# Patient Record
Sex: FEMALE | Race: BLACK OR AFRICAN AMERICAN | Employment: OTHER
[De-identification: names, ages, dates, MRNs, and addresses within clinical notes are randomized per-mention and may not be internally consistent; named-entity substitution may affect disease eponyms.]

---

## 2023-11-03 ENCOUNTER — APPOINTMENT (OUTPATIENT)
Facility: HOSPITAL | Age: 88
End: 2023-11-03
Payer: MEDICARE

## 2023-11-03 ENCOUNTER — HOSPITAL ENCOUNTER (INPATIENT)
Facility: HOSPITAL | Age: 88
LOS: 6 days | End: 2023-11-10
Attending: STUDENT IN AN ORGANIZED HEALTH CARE EDUCATION/TRAINING PROGRAM | Admitting: FAMILY MEDICINE
Payer: MEDICARE

## 2023-11-03 DIAGNOSIS — R41.82 ALTERED MENTAL STATUS, UNSPECIFIED ALTERED MENTAL STATUS TYPE: ICD-10-CM

## 2023-11-03 DIAGNOSIS — R00.0 TACHYCARDIA: ICD-10-CM

## 2023-11-03 DIAGNOSIS — D72.829 LEUKOCYTOSIS, UNSPECIFIED TYPE: ICD-10-CM

## 2023-11-03 DIAGNOSIS — E87.0 HYPERNATREMIA: ICD-10-CM

## 2023-11-03 DIAGNOSIS — A41.9 SEPSIS, DUE TO UNSPECIFIED ORGANISM, UNSPECIFIED WHETHER ACUTE ORGAN DYSFUNCTION PRESENT (HCC): ICD-10-CM

## 2023-11-03 DIAGNOSIS — R79.89 ELEVATED TROPONIN: ICD-10-CM

## 2023-11-03 DIAGNOSIS — Z51.5 END OF LIFE CARE: Primary | ICD-10-CM

## 2023-11-03 LAB
ALBUMIN SERPL-MCNC: 3.1 G/DL (ref 3.4–5)
ALBUMIN/GLOB SERPL: 0.7 (ref 0.8–1.7)
ALP SERPL-CCNC: 87 U/L (ref 45–117)
ALT SERPL-CCNC: 32 U/L (ref 13–56)
ANION GAP SERPL CALC-SCNC: 8 MMOL/L (ref 3–18)
APAP SERPL-MCNC: <2 UG/ML (ref 10–30)
AST SERPL-CCNC: 22 U/L (ref 10–38)
BASOPHILS # BLD: 0 K/UL (ref 0–0.1)
BASOPHILS NFR BLD: 0 % (ref 0–2)
BILIRUB SERPL-MCNC: 0.8 MG/DL (ref 0.2–1)
BUN SERPL-MCNC: 68 MG/DL (ref 7–18)
BUN/CREAT SERPL: 45 (ref 12–20)
CALCIUM SERPL-MCNC: 9.6 MG/DL (ref 8.5–10.1)
CHLORIDE SERPL-SCNC: 130 MMOL/L (ref 100–111)
CO2 SERPL-SCNC: 27 MMOL/L (ref 21–32)
CREAT SERPL-MCNC: 1.51 MG/DL (ref 0.6–1.3)
DIFFERENTIAL METHOD BLD: ABNORMAL
EOSINOPHIL # BLD: 0 K/UL (ref 0–0.4)
EOSINOPHIL NFR BLD: 0 % (ref 0–5)
ERYTHROCYTE [DISTWIDTH] IN BLOOD BY AUTOMATED COUNT: 15.5 % (ref 11.6–14.5)
ETHANOL SERPL-MCNC: <3 MG/DL (ref 0–3)
GLOBULIN SER CALC-MCNC: 4.4 G/DL (ref 2–4)
GLUCOSE BLD STRIP.AUTO-MCNC: 162 MG/DL (ref 70–110)
GLUCOSE SERPL-MCNC: 173 MG/DL (ref 74–99)
HCT VFR BLD AUTO: 59.5 % (ref 35–45)
HGB BLD-MCNC: 18.4 G/DL (ref 12–16)
IMM GRANULOCYTES # BLD AUTO: 0.2 K/UL (ref 0–0.04)
IMM GRANULOCYTES NFR BLD AUTO: 1 % (ref 0–0.5)
LACTATE BLD-SCNC: 1.52 MMOL/L (ref 0.4–2)
LYMPHOCYTES # BLD: 1.5 K/UL (ref 0.9–3.6)
LYMPHOCYTES NFR BLD: 7 % (ref 21–52)
MCH RBC QN AUTO: 28.8 PG (ref 24–34)
MCHC RBC AUTO-ENTMCNC: 30.9 G/DL (ref 31–37)
MCV RBC AUTO: 93.1 FL (ref 78–100)
MONOCYTES # BLD: 1 K/UL (ref 0.05–1.2)
MONOCYTES NFR BLD: 5 % (ref 3–10)
NEUTS SEG # BLD: 18.2 K/UL (ref 1.8–8)
NEUTS SEG NFR BLD: 87 % (ref 40–73)
NRBC # BLD: 0 K/UL (ref 0–0.01)
NRBC BLD-RTO: 0 PER 100 WBC
PLATELET # BLD AUTO: 381 K/UL (ref 135–420)
PLATELET COMMENT: ABNORMAL
PMV BLD AUTO: 11.4 FL (ref 9.2–11.8)
POTASSIUM SERPL-SCNC: 3.8 MMOL/L (ref 3.5–5.5)
PROT SERPL-MCNC: 7.5 G/DL (ref 6.4–8.2)
RBC # BLD AUTO: 6.39 M/UL (ref 4.2–5.3)
RBC MORPH BLD: ABNORMAL
SALICYLATES SERPL-MCNC: <1.7 MG/DL (ref 2.8–20)
SODIUM SERPL-SCNC: 165 MMOL/L (ref 136–145)
TROPONIN I SERPL HS-MCNC: 61 NG/L (ref 0–54)
TROPONIN I SERPL HS-MCNC: 69 NG/L (ref 0–54)
TSH SERPL DL<=0.05 MIU/L-ACNC: 0.11 UIU/ML (ref 0.36–3.74)
WBC # BLD AUTO: 20.9 K/UL (ref 4.6–13.2)

## 2023-11-03 PROCEDURE — 80053 COMPREHEN METABOLIC PANEL: CPT

## 2023-11-03 PROCEDURE — 2580000003 HC RX 258: Performed by: STUDENT IN AN ORGANIZED HEALTH CARE EDUCATION/TRAINING PROGRAM

## 2023-11-03 PROCEDURE — 96365 THER/PROPH/DIAG IV INF INIT: CPT

## 2023-11-03 PROCEDURE — 84443 ASSAY THYROID STIM HORMONE: CPT

## 2023-11-03 PROCEDURE — 70450 CT HEAD/BRAIN W/O DYE: CPT

## 2023-11-03 PROCEDURE — 71045 X-RAY EXAM CHEST 1 VIEW: CPT

## 2023-11-03 PROCEDURE — 93005 ELECTROCARDIOGRAM TRACING: CPT | Performed by: STUDENT IN AN ORGANIZED HEALTH CARE EDUCATION/TRAINING PROGRAM

## 2023-11-03 PROCEDURE — 82962 GLUCOSE BLOOD TEST: CPT

## 2023-11-03 PROCEDURE — 84484 ASSAY OF TROPONIN QUANT: CPT

## 2023-11-03 PROCEDURE — 80143 DRUG ASSAY ACETAMINOPHEN: CPT

## 2023-11-03 PROCEDURE — 80179 DRUG ASSAY SALICYLATE: CPT

## 2023-11-03 PROCEDURE — 83605 ASSAY OF LACTIC ACID: CPT

## 2023-11-03 PROCEDURE — 85025 COMPLETE CBC W/AUTO DIFF WBC: CPT

## 2023-11-03 PROCEDURE — 82077 ASSAY SPEC XCP UR&BREATH IA: CPT

## 2023-11-03 PROCEDURE — 6360000002 HC RX W HCPCS: Performed by: STUDENT IN AN ORGANIZED HEALTH CARE EDUCATION/TRAINING PROGRAM

## 2023-11-03 PROCEDURE — 99285 EMERGENCY DEPT VISIT HI MDM: CPT

## 2023-11-03 PROCEDURE — 87040 BLOOD CULTURE FOR BACTERIA: CPT

## 2023-11-03 PROCEDURE — 96366 THER/PROPH/DIAG IV INF ADDON: CPT

## 2023-11-03 RX ORDER — DEXTROSE MONOHYDRATE 50 MG/ML
INJECTION, SOLUTION INTRAVENOUS CONTINUOUS
Status: DISCONTINUED | OUTPATIENT
Start: 2023-11-03 | End: 2023-11-07

## 2023-11-03 RX ADMIN — CEFEPIME 2000 MG: 2 INJECTION, POWDER, FOR SOLUTION INTRAVENOUS at 21:45

## 2023-11-04 PROBLEM — Z86.73 RECENT CEREBROVASCULAR ACCIDENT (CVA): Status: ACTIVE | Noted: 2023-11-04

## 2023-11-04 PROBLEM — I10 HTN (HYPERTENSION): Status: ACTIVE | Noted: 2023-11-04

## 2023-11-04 PROBLEM — E11.9 DM (DIABETES MELLITUS) (HCC): Status: ACTIVE | Noted: 2023-11-04

## 2023-11-04 PROBLEM — E87.0 HYPERNATREMIA: Status: ACTIVE | Noted: 2023-11-04

## 2023-11-04 PROBLEM — D72.829 LEUKOCYTOSIS: Status: ACTIVE | Noted: 2023-11-04

## 2023-11-04 PROBLEM — Z86.39 HISTORY OF HYPOTHYROIDISM: Status: ACTIVE | Noted: 2023-11-04

## 2023-11-04 LAB
AMORPH CRY URNS QL MICRO: ABNORMAL
AMPHET UR QL SCN: NEGATIVE
ANION GAP SERPL CALC-SCNC: 11 MMOL/L (ref 3–18)
ANION GAP SERPL CALC-SCNC: 9 MMOL/L (ref 3–18)
APPEARANCE UR: ABNORMAL
BACTERIA URNS QL MICRO: ABNORMAL /HPF
BARBITURATES UR QL SCN: NEGATIVE
BENZODIAZ UR QL: NEGATIVE
BILIRUB UR QL: ABNORMAL
BUN SERPL-MCNC: 81 MG/DL (ref 7–18)
BUN SERPL-MCNC: 92 MG/DL (ref 7–18)
BUN/CREAT SERPL: 43 (ref 12–20)
BUN/CREAT SERPL: 44 (ref 12–20)
CALCIUM SERPL-MCNC: 9 MG/DL (ref 8.5–10.1)
CALCIUM SERPL-MCNC: 9 MG/DL (ref 8.5–10.1)
CANNABINOIDS UR QL SCN: NEGATIVE
CHLORIDE SERPL-SCNC: 127 MMOL/L (ref 100–111)
CHLORIDE SERPL-SCNC: 128 MMOL/L (ref 100–111)
CO2 SERPL-SCNC: 20 MMOL/L (ref 21–32)
CO2 SERPL-SCNC: 26 MMOL/L (ref 21–32)
COCAINE UR QL SCN: NEGATIVE
COLOR UR: ABNORMAL
CREAT SERPL-MCNC: 1.87 MG/DL (ref 0.6–1.3)
CREAT SERPL-MCNC: 2.08 MG/DL (ref 0.6–1.3)
CREAT UR-MCNC: 159 MG/DL (ref 30–125)
EPITH CASTS URNS QL MICRO: ABNORMAL /LPF (ref 0–5)
GLUCOSE BLD STRIP.AUTO-MCNC: 143 MG/DL (ref 70–110)
GLUCOSE BLD STRIP.AUTO-MCNC: 145 MG/DL (ref 70–110)
GLUCOSE BLD STRIP.AUTO-MCNC: 191 MG/DL (ref 70–110)
GLUCOSE BLD STRIP.AUTO-MCNC: 205 MG/DL (ref 70–110)
GLUCOSE BLD STRIP.AUTO-MCNC: 216 MG/DL (ref 70–110)
GLUCOSE BLD STRIP.AUTO-MCNC: 245 MG/DL (ref 70–110)
GLUCOSE SERPL-MCNC: 189 MG/DL (ref 74–99)
GLUCOSE SERPL-MCNC: 221 MG/DL (ref 74–99)
GLUCOSE UR STRIP.AUTO-MCNC: NEGATIVE MG/DL
GRAN CASTS URNS QL MICRO: ABNORMAL /LPF
HGB UR QL STRIP: NEGATIVE
KETONES UR QL STRIP.AUTO: ABNORMAL MG/DL
LEUKOCYTE ESTERASE UR QL STRIP.AUTO: ABNORMAL
Lab: NORMAL
METHADONE UR QL: NEGATIVE
MUCOUS THREADS URNS QL MICRO: ABNORMAL /LPF
NITRITE UR QL STRIP.AUTO: NEGATIVE
OPIATES UR QL: NEGATIVE
PCP UR QL: NEGATIVE
PH UR STRIP: 5 (ref 5–8)
POTASSIUM SERPL-SCNC: 3.9 MMOL/L (ref 3.5–5.5)
POTASSIUM SERPL-SCNC: 4.5 MMOL/L (ref 3.5–5.5)
PROT UR STRIP-MCNC: ABNORMAL MG/DL
RBC #/AREA URNS HPF: NEGATIVE /HPF (ref 0–5)
SODIUM SERPL-SCNC: 159 MMOL/L (ref 136–145)
SODIUM SERPL-SCNC: 162 MMOL/L (ref 136–145)
SODIUM UR-SCNC: 18 MMOL/L (ref 20–110)
SP GR UR REFRACTOMETRY: 1.02 (ref 1–1.03)
UROBILINOGEN UR QL STRIP.AUTO: 1 EU/DL (ref 0.2–1)
WBC URNS QL MICRO: ABNORMAL /HPF (ref 0–5)

## 2023-11-04 PROCEDURE — 2580000003 HC RX 258: Performed by: STUDENT IN AN ORGANIZED HEALTH CARE EDUCATION/TRAINING PROGRAM

## 2023-11-04 PROCEDURE — 6360000002 HC RX W HCPCS: Performed by: FAMILY MEDICINE

## 2023-11-04 PROCEDURE — 82570 ASSAY OF URINE CREATININE: CPT

## 2023-11-04 PROCEDURE — 80048 BASIC METABOLIC PNL TOTAL CA: CPT

## 2023-11-04 PROCEDURE — 1100000000 HC RM PRIVATE

## 2023-11-04 PROCEDURE — 51702 INSERT TEMP BLADDER CATH: CPT

## 2023-11-04 PROCEDURE — 36415 COLL VENOUS BLD VENIPUNCTURE: CPT

## 2023-11-04 PROCEDURE — 6370000000 HC RX 637 (ALT 250 FOR IP): Performed by: FAMILY MEDICINE

## 2023-11-04 PROCEDURE — 81001 URINALYSIS AUTO W/SCOPE: CPT

## 2023-11-04 PROCEDURE — 87086 URINE CULTURE/COLONY COUNT: CPT

## 2023-11-04 PROCEDURE — 84300 ASSAY OF URINE SODIUM: CPT

## 2023-11-04 PROCEDURE — 80307 DRUG TEST PRSMV CHEM ANLYZR: CPT

## 2023-11-04 PROCEDURE — 82962 GLUCOSE BLOOD TEST: CPT

## 2023-11-04 RX ORDER — SODIUM CHLORIDE 0.9 % (FLUSH) 0.9 %
5-40 SYRINGE (ML) INJECTION EVERY 12 HOURS SCHEDULED
Status: DISCONTINUED | OUTPATIENT
Start: 2023-11-04 | End: 2023-11-07

## 2023-11-04 RX ORDER — POLYETHYLENE GLYCOL 3350 17 G/17G
17 POWDER, FOR SOLUTION ORAL DAILY PRN
Status: DISCONTINUED | OUTPATIENT
Start: 2023-11-04 | End: 2023-11-07

## 2023-11-04 RX ORDER — ONDANSETRON 2 MG/ML
4 INJECTION INTRAMUSCULAR; INTRAVENOUS EVERY 6 HOURS PRN
Status: DISCONTINUED | OUTPATIENT
Start: 2023-11-04 | End: 2023-11-07

## 2023-11-04 RX ORDER — ACETAMINOPHEN 325 MG/1
650 TABLET ORAL EVERY 6 HOURS PRN
Status: DISCONTINUED | OUTPATIENT
Start: 2023-11-04 | End: 2023-11-10 | Stop reason: HOSPADM

## 2023-11-04 RX ORDER — DEXTROSE MONOHYDRATE 100 MG/ML
INJECTION, SOLUTION INTRAVENOUS CONTINUOUS PRN
Status: DISCONTINUED | OUTPATIENT
Start: 2023-11-04 | End: 2023-11-07

## 2023-11-04 RX ORDER — SODIUM CHLORIDE 9 MG/ML
INJECTION, SOLUTION INTRAVENOUS PRN
Status: DISCONTINUED | OUTPATIENT
Start: 2023-11-04 | End: 2023-11-07

## 2023-11-04 RX ORDER — ONDANSETRON 4 MG/1
4 TABLET, ORALLY DISINTEGRATING ORAL EVERY 8 HOURS PRN
Status: DISCONTINUED | OUTPATIENT
Start: 2023-11-04 | End: 2023-11-07

## 2023-11-04 RX ORDER — INSULIN LISPRO 100 [IU]/ML
0-8 INJECTION, SOLUTION INTRAVENOUS; SUBCUTANEOUS
Status: DISCONTINUED | OUTPATIENT
Start: 2023-11-04 | End: 2023-11-07

## 2023-11-04 RX ORDER — INSULIN LISPRO 100 [IU]/ML
0-4 INJECTION, SOLUTION INTRAVENOUS; SUBCUTANEOUS NIGHTLY
Status: DISCONTINUED | OUTPATIENT
Start: 2023-11-04 | End: 2023-11-07

## 2023-11-04 RX ORDER — SODIUM CHLORIDE 0.9 % (FLUSH) 0.9 %
5-40 SYRINGE (ML) INJECTION PRN
Status: DISCONTINUED | OUTPATIENT
Start: 2023-11-04 | End: 2023-11-10 | Stop reason: HOSPADM

## 2023-11-04 RX ORDER — ACETAMINOPHEN 650 MG/1
650 SUPPOSITORY RECTAL EVERY 6 HOURS PRN
Status: DISCONTINUED | OUTPATIENT
Start: 2023-11-04 | End: 2023-11-10 | Stop reason: HOSPADM

## 2023-11-04 RX ORDER — ENOXAPARIN SODIUM 100 MG/ML
40 INJECTION SUBCUTANEOUS DAILY
Status: DISCONTINUED | OUTPATIENT
Start: 2023-11-04 | End: 2023-11-06 | Stop reason: ALTCHOICE

## 2023-11-04 RX ADMIN — ENOXAPARIN SODIUM 40 MG: 100 INJECTION SUBCUTANEOUS at 09:54

## 2023-11-04 RX ADMIN — DEXTROSE MONOHYDRATE: 50 INJECTION, SOLUTION INTRAVENOUS at 00:07

## 2023-11-04 RX ADMIN — INSULIN LISPRO 2 UNITS: 100 INJECTION, SOLUTION INTRAVENOUS; SUBCUTANEOUS at 09:56

## 2023-11-04 RX ADMIN — DEXTROSE MONOHYDRATE: 50 INJECTION, SOLUTION INTRAVENOUS at 14:29

## 2023-11-04 RX ADMIN — DEXTROSE MONOHYDRATE: 50 INJECTION, SOLUTION INTRAVENOUS at 04:13

## 2023-11-04 NOTE — ED NOTES
This RN spoke with in-patient pharmacist about patient's penicillin allergy and possible cross-sensitivity with cefepime; per pharmacist, \"cefepime should not cause an allergic reaction since they are in different classes. \" Pharmacist states it is safe to administer and unlikely to cause reaction. This RN will assess pt for possible allergic reaction during cefepime administration just in case.      Jarod Beckman RN  11/03/23 5440

## 2023-11-04 NOTE — H&P
History & Physical    Patient: Floyd Cash MRN: [id number]  [facility]: [id number]    YOB: 1933  Age: 80 y.o. Sex: female      DOA: 11/3/2023  Primary Care Provider:  Cheryl Harrison MD      Assessment/Plan   Admitted to med/surg floor for hypernatremia    Hypernatremia -  Presenting serum was 168, chloride was also elevated at 137  Nephrology was consulted by ED  Patient placed on D5 water at 75 cc/h  Patient likely hypovolemic and hyponatremic likely acute in nature  Serial BMPs, monitor closely maladjustment rate as needed     Leukocytosis-  WBC 20.9 with left shift, some of which is likely hemoconcentration   Without clear source of infection  Patient also tachycardic  Patient afebrile  Because patient tachycardic with elevated white count, ED physician started cefepime IV, will monitor OFF antibiotics for now and follow temperature curve and culture results and rehydrate   Lactic acid unremarkable  Chest x-ray unremarkable  UA abnormal but had normal epithelial cells  We will order urine culture and urine collected for UA  Follow blood cultures     Diabetes mellitus-  ADA, SSI, fingerstick blood glucose before every meal and nightly     Hypertension-  Monitor blood pressure  Resume home medications as appropriate     Recent CVA-  Ordered PT and OT  Supportive care     History of hypothyroidism-  Check TSH     DVT and GI Prophylaxis     Meds:   acetaminophen 650 mg Supp  aspirin 81 mg    atorvastatin 80 mg Tabs  famotidine 20 mg Tabs  levETIRAcetam 100 mg/mL Soln  Take 5 mL by Mouth Every Night at Bedtime. levETIRAcetam 100 mg/mL Soln  Take 2.5 mL by Mouth Once a Day. LORazepam 2 mg/mL Conc  Take 0.25 mL by Mouth Every 6 Hours As Needed. multivitamin with mineral Liqd  Take 15 mL by Mouth Once a Day. nystatin 100,000 unit/mL Suspension  5 mL by Swish & Spit route 4 Times Daily for 7 days.    oxyCODONE 20 mg/1 mL Syrg  Take 0.25 mL by Mouth Every 4 Hours As Needed for Moderate Pain - 4.0 g/dL    Albumin/Globulin Ratio 0.7 (L) 0.8 - 1.7     TSH    Collection Time: 11/03/23  8:27 PM   Result Value Ref Range    TSH, 3RD GENERATION 0.11 (L) 0.36 - 3.74 uIU/mL   Acetaminophen Level    Collection Time: 11/03/23  8:27 PM   Result Value Ref Range    Acetaminophen Level <2 (L) 10.0 - 08.3 ug/mL   Salicylate    Collection Time: 11/03/23  8:27 PM   Result Value Ref Range    Salicylate, Serum <6.3 (L) 2.8 - 20.0 MG/DL   Ethanol    Collection Time: 11/03/23  8:27 PM   Result Value Ref Range    Ethanol Lvl <3 0 - 3 MG/DL   Troponin    Collection Time: 11/03/23  8:27 PM   Result Value Ref Range    Troponin, High Sensitivity 69 (H) 0 - 54 ng/L   POCT lactic acid (lactate)    Collection Time: 11/03/23  9:44 PM   Result Value Ref Range    POC Lactic Acid 1.52 0.40 - 2.00 mmol/L   Troponin    Collection Time: 11/03/23 10:32 PM   Result Value Ref Range    Troponin, High Sensitivity 61 (H) 0 - 54 ng/L   POCT Glucose    Collection Time: 11/04/23 12:01 AM   Result Value Ref Range    POC Glucose 145 (H) 70 - 110 mg/dL       Procedures/imaging: see electronic medical records for all procedures/Xrays and details which were not copied into this note but were reviewed prior to creation of Plan        CC: Jessica Barton MD

## 2023-11-04 NOTE — ED NOTES
This RN to [facility]. pt per MD verbal order. Diana Atwood RN  11/03/23 7168    Pt placed on purewick.      Diana Atwood RN  11/04/23 7891

## 2023-11-04 NOTE — ED TRIAGE NOTES
Patient arrives to ed via ems from [address] with complaints of sodium level of 162 per nursing staff at [facility]. Pt is non-verbal at baseline with hx of multiple CVAs. Daughter is POA, currently in waiting room. Pt only responsive to painful stimuli att. MD Patricia at bedside to evaluate patient.

## 2023-11-04 NOTE — ED TRIAGE NOTES
Per [facility] transfer form: pt mental status baseline is \"alert, disoriented, but able to follow simple instructions\"

## 2023-11-04 NOTE — PROGRESS NOTES
09:23 AM   Critical result obtained from [address] [place] [address] for Na+ level of 162. Dr Cholo Croft was paged. 09:26   Dr Cholo Croft is aware of above. Also aware that pt not responsive to chest rubs. 0959   Pt is in bed. Breathing at 36/min. Pt moves right arm to chest rubs. 1430    Pt opening eyes slightly and moving right arm occasionally. 1533   Pt was bladder scanned for 372 ml. Will insert  woods cath as per Dr Iam Godfrey if bladder scan is greater than 250 ml.   1708   Woods cath  inserted under aseptic technique assisted by Charlee Duran RN. Urine sample for Urine Na, Creatinine, Urinalysis, Urine Culture , and Urne Drug screen sent to lab. Pt was incontinent of pasty Bowel movements x3. and was cleaned each time. Pt has old Stage 2 decub to sacral area. 1800  Pt was turned to left side.

## 2023-11-04 NOTE — ED NOTES
Pt had a bowel movement. Pt cleaned and changed into a new, clean brief.      Fadumo Shaw RN  11/04/23 4040

## 2023-11-04 NOTE — PROGRESS NOTES
Case Management Assessment  Initial Evaluation    Date/Time of Evaluation: 11/4/2023 4:45 PM  Assessment Completed by: Jef Aleman RN    If patient is discharged prior to next notation, then this note serves as note for discharge by case management. Patient Name: Surya Winters                   YOB: 1933  Diagnosis: Hypernatremia [E87.0]  Tachycardia [R00.0]  Elevated troponin [R79.89]  Altered mental status, unspecified altered mental status type [R41.82]  Leukocytosis, unspecified type [D72.829]  Sepsis, due to unspecified organism, unspecified whether acute organ dysfunction present [facility]) [A41.9]                   Date / Time: 11/3/2023  7:53 PM    Patient Admission Status: Inpatient   Readmission Risk (Low < 19, Mod (19-27), High > 27): Readmission Risk Score: 11.2    Current PCP: Johana Cabral MD  PCP verified by CM? Chart Reviewed: Yes      History Provided by: Medical Record, Other (see comment) ([facility] staff)  Patient Orientation: Unable to Assess    Patient Cognition: Alert    Hospitalization in the last 30 days (Readmission):  No    If yes, Readmission Assessment in  Navigator will be completed. Advance Directives:      Code Status: DNR   Patient's Primary Decision Maker is: Legal Next of Kin      Discharge Planning:    Patient lives with:   Type of Home: Long-Term Care  Primary Care Giver: Other (Comment) ([facility] SNF)  Patient Support Systems include: Friends/Neighbors, Other (Comment) (SNF staff)   Current Financial resources: Medicaid  Current community resources:  Other (Comment) (SNF for LTC)  Current services prior to admission: [address]            Current DME:              Type of Home Care services:       ADLS  Prior functional level: Assistance with the following:, Bathing, Dressing, Toileting, Feeding, Cooking, Housework, Shopping, Mobility  Current functional level: Assistance with the following:, Bathing, Dressing,

## 2023-11-04 NOTE — PROGRESS NOTES
Bedside shift change report given to Uday Hwang RN (oncoming nurse) by Yasmine Gregorio RN (offgoing nurse). Report included the following information Nurse Handoff Report, Intake/Output, and Recent Results.

## 2023-11-04 NOTE — PROGRESS NOTES
CM attempted to call patient's daughter Tiburcio Carter multiple times during shift, using the numbers in the chart. Daughter's cell number has a vm that has not been set up and there is no answer on the home phone listed for he daughter. [facility] three times during shift in order to assist nursing in obtaining medication list for patient and to request alternate contact information for patient's family, but each time CM received a message that the facility was assisting other callers and to call back, or the call went unanswered. Patient daughter Tiburcio Carter return CM call. does not want patient to return to [facility], She wants a facility in [facility]. CM notified the daughter that it can be very hard to find a LTC bed. The daughter stated she would like to find a LTC facility but will choose to bring the patient home with her to the [facility] area if a facility other than [facility] can not be found. CM provided the daughter with the contact information for the Ombudsman in the [address] [place] [address] areas at the daughter's request. The daughter wants all the facilities in [facility]. FOC given. Referrals sent to [facility] and [facility] facilities. Assigned CM to follow up.

## 2023-11-04 NOTE — PROGRESS NOTES
0800  Bedside and Verbal shift change report given to Rosalie Castro RN (oncoming nurse) by Vick Gavin RN (offgoing nurse). Report included the following information Nurse Handoff Report, Adult Overview, Intake/Output, MAR, Recent Results, and Event Log.

## 2023-11-04 NOTE — ED NOTES
Report called and given to Linden Valdez on [facility].      Bijal GosspsBEN lester  11/04/23 7299

## 2023-11-04 NOTE — PROGRESS NOTES
19:55 Assessment completed. Opens eyes spont.& moves RUE. Lungs sounds remain clear bilat but RR remains rapid & shallow. Turned & repositioned with Carrol Magana. 21:50 Spoke with daughter,Rachana re: mom's status. Updated & answered her questions . 22:45 Shift assessment completed. See [facility] flow sheet for details. 03:00 Reassessed with 0 changes noted. Resting quietly in bed with eyes closed between cares x for turning & repositioning q 3 hrs.     06:00 Turned & repositioned with Eris Gaspar RN. 07:30 Bedside and Verbal shift change report given to Jian Al RN (oncoming nurse) by Barbara Mike RN (offgoing nurse). Report included the following information Nurse Handoff Report.

## 2023-11-04 NOTE — ED NOTES
EMERGENCY DEPARTMENT HISTORY AND PHYSICAL EXAM    3:06 AM      Date: 11/3/2023  Patient Name: Mj Hdez    History of Presenting Illness     Chief Complaint   Patient presents with    Abnormal Labs    Altered Mental Status       History From: Patient  HPI  80-year-old female with history of multiple CVAs, nonverbal at baseline, dementia, chronic left-sided weakness, meningioma, hypertension who presents with altered mental status and abnormal labs. Per EMS patient was found to have a sodium level in the 160s. Patient has not been tolerating p.o. recently in the past couple of days at the nursing home. They do not describe any changes in meds, sick contacts, or any other precipitating factors. Patient unable to assess ROS due to mental status. Nursing Notes were all reviewed and agreed with or any disagreements were addressed in the HPI.     PCP: Bill Sanabria MD    Current Facility-Administered Medications   Medication Dose Route Frequency Provider Last Rate Last Admin    sodium chloride flush 0.9 % injection 5-40 mL  5-40 mL IntraVENous 2 times per day Bhavana Amaya MD        sodium chloride flush 0.9 % injection 5-40 mL  5-40 mL IntraVENous PRN Bhavana Amaya MD        0.9 % sodium chloride infusion   IntraVENous PRN Bhavana Amaya MD        enoxaparin (LOVENOX) injection 40 mg  40 mg SubCUTAneous Daily Bhavana Amaya MD        ondansetron (ZOFRAN-ODT) disintegrating tablet 4 mg  4 mg Oral Q8H PRN Bhavana Amaya MD        Or    ondansetron (ZOFRAN) injection 4 mg  4 mg IntraVENous Q6H PRN Bhavana Amaya MD        polyethylene glycol (GLYCOLAX) packet 17 g  17 g Oral Daily PRN Bhavana Amaya MD        acetaminophen (TYLENOL) tablet 650 mg  650 mg Oral Q6H PRN Bhavana Amaya MD        Or    acetaminophen (TYLENOL) suppository 650 mg  650 mg Rectal Q6H PRN Bhavana Amaya MD        dextrose 5 % solution   IntraVENous Continuous [place],

## 2023-11-04 NOTE — CONSULTS
Nephrology Consult    Patient: Mj Hdez  Requesting physician: Dr. Richard Camarena  Reason for consult: Hypernatremia        History of Present Illness:  Mj Hdez is a 80 y.o. female with a past medical history significant for HTN, HLD, Meningioma, CVA, Aphasia, NH resident who was brought to the ED for evaluation of  Altered mental status and abnormal labs. She had not been tolerating po intake for a couple of days at the NH  CT head unremarkable for Acute findings  Labs in ED  remarkable for S Na 168  Hgb18.4 WBC 20.9  Nephrology was consulted for further evaluation and management of Patient was started on IVF D%W and Iv antibiotics and admitted    S Na today down to 162  Remains obtunded. Past Medical History:  Patient Active Problem List   Diagnosis    Hypernatremia    Leukocytosis    DM (diabetes mellitus) (HCC)    HTN (hypertension)    Recent cerebrovascular accident (CVA)    History of hypothyroidism         Social History:  Social History     Socioeconomic History    Marital status: Single     Spouse name: Not on file    Number of children: Not on file    Years of education: Not on file    Highest education level: Not on file   Occupational History    Not on file   Tobacco Use    Smoking status: Not on file    Smokeless tobacco: Not on file   Substance and Sexual Activity    Alcohol use: Not on file    Drug use: Not on file    Sexual activity: Not on file   Other Topics Concern    Not on file   Social History Narrative    Not on file     Social Determinants of Health     Financial Resource Strain: Not on file   Food Insecurity: Not on file   Transportation Needs: Not on file   Physical Activity: Not on file   Stress: Not on file   Social Connections: Not on file   Intimate Partner Violence: Not on file   Housing Stability: Not on file       Family History:  History reviewed. No pertinent family history.     Allergy:  Allergies   Allergen Reactions    Penicillins Other (See Comments)     Patient's daughter,

## 2023-11-04 NOTE — ED NOTES
Pt not noted to have experienced an allergic reaction to cefepime administration.      Anibal Velázquez RN  11/04/23 0006

## 2023-11-05 LAB
ANION GAP SERPL CALC-SCNC: 10 MMOL/L (ref 3–18)
ANION GAP SERPL CALC-SCNC: 11 MMOL/L (ref 3–18)
ANION GAP SERPL CALC-SCNC: 11 MMOL/L (ref 3–18)
ANION GAP SERPL CALC-SCNC: 14 MMOL/L (ref 3–18)
BASOPHILS # BLD: 0 K/UL (ref 0–0.1)
BASOPHILS NFR BLD: 0 % (ref 0–2)
BUN SERPL-MCNC: 102 MG/DL (ref 7–18)
BUN SERPL-MCNC: 105 MG/DL (ref 7–18)
BUN SERPL-MCNC: 106 MG/DL (ref 7–18)
BUN SERPL-MCNC: 93 MG/DL (ref 7–18)
BUN/CREAT SERPL: 33 (ref 12–20)
BUN/CREAT SERPL: 35 (ref 12–20)
BUN/CREAT SERPL: 38 (ref 12–20)
BUN/CREAT SERPL: 40 (ref 12–20)
CALCIUM SERPL-MCNC: 8 MG/DL (ref 8.5–10.1)
CALCIUM SERPL-MCNC: 8.5 MG/DL (ref 8.5–10.1)
CALCIUM SERPL-MCNC: 8.6 MG/DL (ref 8.5–10.1)
CALCIUM SERPL-MCNC: 8.8 MG/DL (ref 8.5–10.1)
CHLORIDE SERPL-SCNC: 114 MMOL/L (ref 100–111)
CHLORIDE SERPL-SCNC: 117 MMOL/L (ref 100–111)
CHLORIDE SERPL-SCNC: 122 MMOL/L (ref 100–111)
CHLORIDE SERPL-SCNC: 124 MMOL/L (ref 100–111)
CO2 SERPL-SCNC: 18 MMOL/L (ref 21–32)
CO2 SERPL-SCNC: 22 MMOL/L (ref 21–32)
CO2 SERPL-SCNC: 23 MMOL/L (ref 21–32)
CO2 SERPL-SCNC: 24 MMOL/L (ref 21–32)
CREAT SERPL-MCNC: 2.33 MG/DL (ref 0.6–1.3)
CREAT SERPL-MCNC: 2.71 MG/DL (ref 0.6–1.3)
CREAT SERPL-MCNC: 3.06 MG/DL (ref 0.6–1.3)
CREAT SERPL-MCNC: 3.19 MG/DL (ref 0.6–1.3)
DIFFERENTIAL METHOD BLD: ABNORMAL
EKG ATRIAL RATE: 109 BPM
EKG DIAGNOSIS: NORMAL
EKG P AXIS: 138 DEGREES
EKG P-R INTERVAL: 126 MS
EKG Q-T INTERVAL: 362 MS
EKG QRS DURATION: 84 MS
EKG QTC CALCULATION (BAZETT): 487 MS
EKG R AXIS: 116 DEGREES
EKG T AXIS: 200 DEGREES
EKG VENTRICULAR RATE: 109 BPM
EOSINOPHIL # BLD: 0 K/UL (ref 0–0.4)
EOSINOPHIL NFR BLD: 0 % (ref 0–5)
ERYTHROCYTE [DISTWIDTH] IN BLOOD BY AUTOMATED COUNT: 14.8 % (ref 11.6–14.5)
GLUCOSE BLD STRIP.AUTO-MCNC: 114 MG/DL (ref 70–110)
GLUCOSE BLD STRIP.AUTO-MCNC: 149 MG/DL (ref 70–110)
GLUCOSE BLD STRIP.AUTO-MCNC: 180 MG/DL (ref 70–110)
GLUCOSE BLD STRIP.AUTO-MCNC: 182 MG/DL (ref 70–110)
GLUCOSE SERPL-MCNC: 181 MG/DL (ref 74–99)
GLUCOSE SERPL-MCNC: 181 MG/DL (ref 74–99)
GLUCOSE SERPL-MCNC: 182 MG/DL (ref 74–99)
GLUCOSE SERPL-MCNC: 200 MG/DL (ref 74–99)
HCT VFR BLD AUTO: 53.4 % (ref 35–45)
HGB BLD-MCNC: 16.7 G/DL (ref 12–16)
IMM GRANULOCYTES # BLD AUTO: 0 K/UL
IMM GRANULOCYTES NFR BLD AUTO: 0 %
LYMPHOCYTES # BLD: 0 K/UL (ref 0.9–3.6)
LYMPHOCYTES NFR BLD: 0 % (ref 21–52)
MAGNESIUM SERPL-MCNC: 2.5 MG/DL (ref 1.6–2.6)
MAGNESIUM SERPL-MCNC: 2.6 MG/DL (ref 1.6–2.6)
MCH RBC QN AUTO: 29.2 PG (ref 24–34)
MCHC RBC AUTO-ENTMCNC: 31.3 G/DL (ref 31–37)
MCV RBC AUTO: 93.5 FL (ref 78–100)
MONOCYTES # BLD: 0 K/UL (ref 0.05–1.2)
MONOCYTES NFR BLD: 0 % (ref 3–10)
NEUTS SEG # BLD: 0 K/UL (ref 1.8–8)
NEUTS SEG NFR BLD: 0 % (ref 40–73)
NRBC # BLD: 0.02 K/UL (ref 0–0.01)
NRBC BLD-RTO: 0.1 PER 100 WBC
PLATELET # BLD AUTO: 250 K/UL (ref 135–420)
PLATELET COMMENT: ABNORMAL
PMV BLD AUTO: 12 FL (ref 9.2–11.8)
POTASSIUM SERPL-SCNC: 3.4 MMOL/L (ref 3.5–5.5)
POTASSIUM SERPL-SCNC: 3.5 MMOL/L (ref 3.5–5.5)
POTASSIUM SERPL-SCNC: 3.7 MMOL/L (ref 3.5–5.5)
POTASSIUM SERPL-SCNC: 3.9 MMOL/L (ref 3.5–5.5)
RBC # BLD AUTO: 5.71 M/UL (ref 4.2–5.3)
RBC MORPH BLD: ABNORMAL
SODIUM SERPL-SCNC: 149 MMOL/L (ref 136–145)
SODIUM SERPL-SCNC: 151 MMOL/L (ref 136–145)
SODIUM SERPL-SCNC: 154 MMOL/L (ref 136–145)
SODIUM SERPL-SCNC: 156 MMOL/L (ref 136–145)
WBC # BLD AUTO: 24 K/UL (ref 4.6–13.2)

## 2023-11-05 PROCEDURE — 2580000003 HC RX 258: Performed by: FAMILY MEDICINE

## 2023-11-05 PROCEDURE — 6360000002 HC RX W HCPCS: Performed by: FAMILY MEDICINE

## 2023-11-05 PROCEDURE — 1100000000 HC RM PRIVATE

## 2023-11-05 PROCEDURE — 80048 BASIC METABOLIC PNL TOTAL CA: CPT

## 2023-11-05 PROCEDURE — 85025 COMPLETE CBC W/AUTO DIFF WBC: CPT

## 2023-11-05 PROCEDURE — 36415 COLL VENOUS BLD VENIPUNCTURE: CPT

## 2023-11-05 PROCEDURE — 82962 GLUCOSE BLOOD TEST: CPT

## 2023-11-05 PROCEDURE — 51702 INSERT TEMP BLADDER CATH: CPT

## 2023-11-05 PROCEDURE — 83735 ASSAY OF MAGNESIUM: CPT

## 2023-11-05 PROCEDURE — 2580000003 HC RX 258: Performed by: STUDENT IN AN ORGANIZED HEALTH CARE EDUCATION/TRAINING PROGRAM

## 2023-11-05 RX ADMIN — ENOXAPARIN SODIUM 40 MG: 100 INJECTION SUBCUTANEOUS at 09:07

## 2023-11-05 RX ADMIN — DEXTROSE MONOHYDRATE: 50 INJECTION, SOLUTION INTRAVENOUS at 02:05

## 2023-11-05 RX ADMIN — SODIUM CHLORIDE, PRESERVATIVE FREE 10 ML: 5 INJECTION INTRAVENOUS at 09:07

## 2023-11-05 NOTE — PLAN OF CARE

## 2023-11-05 NOTE — PROGRESS NOTES
Nephrology Progress Note    Patient: Patrcia Apgar    History of Present Illness:  Patrcia Apgar is a 80 y.o. female with a past medical history significant for HTN, HLD, Meningioma, CVA, Aphasia, NH resident who was brought to the ED for evaluation of  Altered mental status and abnormal labs. She had not been tolerating po intake for a couple of days at the NH  CT head unremarkable for Acute findings  Labs in ED  remarkable for S Na 168  Hgb18.4 WBC 20.9  Nephrology was consulted for further evaluation and management of Patient was started on IVF D%W and Iv antibiotics and admitted    S Na today down to 154 this a.m. Remains obtunded. Post void residual was high and woods catheter was placed. Past Medical History:  Patient Active Problem List   Diagnosis    Hypernatremia    Leukocytosis    DM (diabetes mellitus) ([address])    HTN (hypertension)    Recent cerebrovascular accident (CVA)    History of hypothyroidism       Allergy:  Allergies   Allergen Reactions    Penicillins Other (See Comments)     Patient's daughter, Jelani Tobin, states she is unaware of the exact reaction penicillin causes, but that the reaction led to a hospital stay.     Sulfamethoxazole-Trimethoprim         Medication:      Inpatient meds:  Current Facility-Administered Medications   Medication Dose Route Frequency    sodium chloride flush 0.9 % injection 5-40 mL  5-40 mL IntraVENous 2 times per day    sodium chloride flush 0.9 % injection 5-40 mL  5-40 mL IntraVENous PRN    0.9 % sodium chloride infusion   IntraVENous PRN    enoxaparin (LOVENOX) injection 40 mg  40 mg SubCUTAneous Daily    ondansetron (ZOFRAN-ODT) disintegrating tablet 4 mg  4 mg Oral Q8H PRN    Or    ondansetron (ZOFRAN) injection 4 mg  4 mg IntraVENous Q6H PRN    polyethylene glycol (GLYCOLAX) packet 17 g  17 g Oral Daily PRN    acetaminophen (TYLENOL) tablet 650 mg  650 mg Oral Q6H PRN    Or    acetaminophen (TYLENOL) suppository 650 mg  650 mg Rectal Q6H PRN frontal meningioma  Severe central atrophy and chronic white matter disease. No acute process identified on noncontrast CT without comparison       Impression:     Hypernatremia sec to poor oral intake and Dehydration. .Estimated Free water deficit presently 3.75L. S na 168=> 162, =>154 correcting at acceptable rate  DEEPTHI likely prerenal, poss ATN. S Cr 1.33=>1.87=>2.71. Poss underlying CKD. U Na 18  Hemoconcentration   Poss Sepsis. BC no growth in 2 days  Leukocytosis  Hypothyroidism  HTN  HLD  Meningioma  H/o  CVA with Aphasia,  Urinary retention, Woods catheter placed    Plan:     Continue IVF D5W at 100 ml/hr  Keep woods catheter for now  Continue IV Antibiotics, f/u on Blood and Urine Cultures  Serial BMP  Encourage po fluids when safe to resume oral intake.          Merline Junes, MD,  MPH [facility]  [phone]

## 2023-11-05 NOTE — PROGRESS NOTES
Physical Therapy    Orders received. Chart reviewed. Per CM chart pt is bed bound and total care at baseline with reports of pt returning back to LTC at [address]. Per Nurse Sandra who confirmed that the patient is total care and bed bound. At this time time will d/c the PT order as they are not appropriate for skilled PT services. Recommend patient return back to LTC at [address] upon d/c.      Thank you for the referral.

## 2023-11-06 ENCOUNTER — HOSPITAL ENCOUNTER (INPATIENT)
Facility: HOSPITAL | Age: 88
Discharge: HOME | End: 2023-11-09
Payer: MEDICARE

## 2023-11-06 PROBLEM — N17.9 AKI (ACUTE KIDNEY INJURY) (HCC): Status: ACTIVE | Noted: 2023-11-06

## 2023-11-06 LAB
ANION GAP SERPL CALC-SCNC: 11 MMOL/L (ref 3–18)
BACTERIA SPEC CULT: NORMAL
BUN SERPL-MCNC: 114 MG/DL (ref 7–18)
BUN SERPL-MCNC: 119 MG/DL (ref 7–18)
BUN SERPL-MCNC: 121 MG/DL (ref 7–18)
BUN/CREAT SERPL: 32 (ref 12–20)
BUN/CREAT SERPL: 33 (ref 12–20)
BUN/CREAT SERPL: 33 (ref 12–20)
CALCIUM SERPL-MCNC: 7.9 MG/DL (ref 8.5–10.1)
CALCIUM SERPL-MCNC: 8 MG/DL (ref 8.5–10.1)
CALCIUM SERPL-MCNC: 8.4 MG/DL (ref 8.5–10.1)
CHLORIDE SERPL-SCNC: 111 MMOL/L (ref 100–111)
CHLORIDE SERPL-SCNC: 114 MMOL/L (ref 100–111)
CHLORIDE SERPL-SCNC: 114 MMOL/L (ref 100–111)
CO2 SERPL-SCNC: 22 MMOL/L (ref 21–32)
CREAT SERPL-MCNC: 3.49 MG/DL (ref 0.6–1.3)
CREAT SERPL-MCNC: 3.67 MG/DL (ref 0.6–1.3)
CREAT SERPL-MCNC: 3.71 MG/DL (ref 0.6–1.3)
ERYTHROCYTE [DISTWIDTH] IN BLOOD BY AUTOMATED COUNT: 14.7 % (ref 11.6–14.5)
GLUCOSE BLD STRIP.AUTO-MCNC: 147 MG/DL (ref 70–110)
GLUCOSE BLD STRIP.AUTO-MCNC: 150 MG/DL (ref 70–110)
GLUCOSE BLD STRIP.AUTO-MCNC: 151 MG/DL (ref 70–110)
GLUCOSE BLD STRIP.AUTO-MCNC: 173 MG/DL (ref 70–110)
GLUCOSE SERPL-MCNC: 122 MG/DL (ref 74–99)
GLUCOSE SERPL-MCNC: 164 MG/DL (ref 74–99)
GLUCOSE SERPL-MCNC: 169 MG/DL (ref 74–99)
HCT VFR BLD AUTO: 45.8 % (ref 35–45)
HGB BLD-MCNC: 14.7 G/DL (ref 12–16)
MCH RBC QN AUTO: 28.8 PG (ref 24–34)
MCHC RBC AUTO-ENTMCNC: 32.1 G/DL (ref 31–37)
MCV RBC AUTO: 89.6 FL (ref 78–100)
NRBC # BLD: 0 K/UL (ref 0–0.01)
NRBC BLD-RTO: 0 PER 100 WBC
PLATELET # BLD AUTO: 247 K/UL (ref 135–420)
PMV BLD AUTO: 12.5 FL (ref 9.2–11.8)
POTASSIUM SERPL-SCNC: 4 MMOL/L (ref 3.5–5.5)
POTASSIUM SERPL-SCNC: 4 MMOL/L (ref 3.5–5.5)
POTASSIUM SERPL-SCNC: 4.1 MMOL/L (ref 3.5–5.5)
RBC # BLD AUTO: 5.11 M/UL (ref 4.2–5.3)
SERVICE CMNT-IMP: NORMAL
SODIUM SERPL-SCNC: 144 MMOL/L (ref 136–145)
SODIUM SERPL-SCNC: 147 MMOL/L (ref 136–145)
SODIUM SERPL-SCNC: 147 MMOL/L (ref 136–145)
WBC # BLD AUTO: 19.9 K/UL (ref 4.6–13.2)

## 2023-11-06 PROCEDURE — 85027 COMPLETE CBC AUTOMATED: CPT

## 2023-11-06 PROCEDURE — 1100000000 HC RM PRIVATE

## 2023-11-06 PROCEDURE — 2580000003 HC RX 258: Performed by: FAMILY MEDICINE

## 2023-11-06 PROCEDURE — 51702 INSERT TEMP BLADDER CATH: CPT

## 2023-11-06 PROCEDURE — 99497 ADVNCD CARE PLAN 30 MIN: CPT | Performed by: NURSE PRACTITIONER

## 2023-11-06 PROCEDURE — 82962 GLUCOSE BLOOD TEST: CPT

## 2023-11-06 PROCEDURE — 80048 BASIC METABOLIC PNL TOTAL CA: CPT

## 2023-11-06 PROCEDURE — 76770 US EXAM ABDO BACK WALL COMP: CPT

## 2023-11-06 PROCEDURE — 36415 COLL VENOUS BLD VENIPUNCTURE: CPT

## 2023-11-06 PROCEDURE — 6360000002 HC RX W HCPCS: Performed by: FAMILY MEDICINE

## 2023-11-06 PROCEDURE — 99222 1ST HOSP IP/OBS MODERATE 55: CPT | Performed by: NURSE PRACTITIONER

## 2023-11-06 PROCEDURE — 2580000003 HC RX 258: Performed by: INTERNAL MEDICINE

## 2023-11-06 RX ORDER — HEPARIN SODIUM 5000 [USP'U]/ML
5000 INJECTION, SOLUTION INTRAVENOUS; SUBCUTANEOUS EVERY 8 HOURS
Status: DISCONTINUED | OUTPATIENT
Start: 2023-11-07 | End: 2023-11-07

## 2023-11-06 RX ADMIN — ENOXAPARIN SODIUM 40 MG: 100 INJECTION SUBCUTANEOUS at 08:35

## 2023-11-06 RX ADMIN — DEXTROSE MONOHYDRATE: 50 INJECTION, SOLUTION INTRAVENOUS at 17:57

## 2023-11-06 RX ADMIN — SODIUM CHLORIDE, PRESERVATIVE FREE 10 ML: 5 INJECTION INTRAVENOUS at 21:12

## 2023-11-06 RX ADMIN — DEXTROSE MONOHYDRATE: 50 INJECTION, SOLUTION INTRAVENOUS at 06:08

## 2023-11-06 RX ADMIN — SODIUM CHLORIDE, PRESERVATIVE FREE 10 ML: 5 INJECTION INTRAVENOUS at 08:39

## 2023-11-06 NOTE — PROGRESS NOTES
N/o obtained to change woods catheter. Catheter removed using clean technique. 16f woods inserted using sterile technique. Pt tolerated procedure, 0 distress noted, malodorous, dayne urine noted in collection chamber.

## 2023-11-06 NOTE — PROGRESS NOTES
Pharmacist Review and Automatic Dose Adjustment of Prophylactic Enoxaparin    *Review reason for admission/hospital problem list*    The reviewing pharmacist has made an adjustment to the ordered enoxaparin dose or converted to UFH per the approved [facility] protocol and table as identified below. Rene Roche is a 80 y.o. female.      Recent Labs     11/06/23  0154 11/06/23  0717 11/06/23  1409   CREATININE 3.49* 3.71* 3.67*         Estimated CrCl , 10 ml/min     Scr = 3.67       Age = 89      weight = 58.5 kg     Height:   Ht Readings from Last 1 Encounters:   No data found for Ht     Weight:  Wt Readings from Last 1 Encounters:   11/03/23 58.5 kg (128 lb 14.4 oz)               Plan: Based upon the patient's weight and renal function, the ordered does of enoxaparin 40 mg SQ daily has been changed/converted to Heparin 5000 units SQ q8h      Thank you,  Ken Issa, [facility]

## 2023-11-06 NOTE — ACP (ADVANCE CARE PLANNING)
Advance Care Planning     General Advance Care Planning (ACP) Conversation    Date of Conversation: 11/6/2023  Conducted with:  Healthcare Decision Maker: Named in Advance Directive or Healthcare Power of [address] [address] Decision Maker:    Primary Decision Maker: Raquel Thrasher - Child - [phone]    Today we documented Decision Maker(s) consistent with ACP documents on file. Content/Action Overview: Has ACP document(s) on file - reflects the patient's care preferences  Reviewed DNR/DNI and daughter confirms current DNR status - completed forms on file (place new order if needed)    [phone] Seen today in room 329 along with Patricia Foley, NP. Lying supine. No response to verbal or tactile stimulation. Respirations unlabored on room air    Came to the ED 11/3/2023 from [facility]  per EMS for hypernatremia (165) and altered mental status. PMH significant for multiple strokes, HTN, vascular dementia, chronic left sided weakness, non-verbal at baseline (information gathered from medical records)    Admitted to medical unit for hypernatremia (nephrology consultation, trend BMP, IV hydration), leukocytosis (IV antibiotics, trend WBC, follow blood and urine culture), T2DM (sliding scale with accucheks), HTN (home medications), recent CVA (PT/OT), hypothyroidism (TSH    Palliative Medicine consultation placed by Dr Sierra Sands for goals of care discussion and overwhelming symptoms    1145: telephone conversation with Myriam Quintana, daughter/MPOA. Introduced role of palliative care to the hospitalized patient. Lives currently at [facility] after a recent admission at [facility] for acute stroke. Before that she was living with her daughter. Prior to admission, she was able to tolerate being fed in ADLs. There is an AMD on file naming her daughter, Raquel Thrasher as her sole MPOA.  Myriam Quintana acknowledged this was accurate and she agrees to act in that role. Discussed current code status of DNR and Farzaneh Alvarez said that was accurate. Farzaneh Alvarez said that she did not think that resuscitating her mother in the event of cardiac or respiratory arrest would be in her best interest. Farzaneh Alvarez wants her mother to live as long as possible but does not want her exposed to overly aggressive treatments. Farzaneh Alvarez voice concerns about the quality of care her mother is receiving at her current facility and would like to explore other options. Per notes care management is working toward that goal. Reviewed that a hospice conversation may also be appropriate after the current metabolic derangements are rectified and Farzaneh Alvarez agreed to that. Disposition plan: to be determined. Will await further input from care management about placement. Palliative care will continue to follow Kenia Bonilla  and her family during her hospitalization and support them as they make healthcare decisions and define goals of care.       Lisa Atkins RN, MSN  Palliative Medicine  P: [phone]

## 2023-11-06 NOTE — CONSULTS
Palliative Medicine Consult    Patient Name: Deidra Benítez  YOB: 1933    Date of Initial Consult: 11/6/2023  Reason for Consult: Goals of care discussions  Requesting Provider: Bita Pickett MD   Primary Care Physician: Terri Musa MD      SUMMARY:   Deidra Benítez is a 80 y.o. with a past history of multiple strokes, hypertension, vascular dementia, chronic left-sided weakness, nonverbal at baseline, who was admitted on 11/3/2023 from [address] [place] [address] with a diagnosis of hypernatremia, leukocytosis, dehydration, DEEPTHI, and altered mental status. Current medical issues leading to Palliative Medicine involvement include: Goals of care discussions. PALLIATIVE DIAGNOSES:   Goals of care/advance care planning  Hypernatremia/dehydration  DEEPTHI  Altered mental status  Debility       PLAN:   Goals of care/advance care planning:  Reviewed records from the following unique sources (outside institutions or providers from different services): Hospitalist, nephrologist, emergency room physician. Reviewed the results of each unique test including sodium 147, which has improved significantly since admission (168). Patient's creatinine is elevated at 3.71, trending up, nephrology following. White blood cell count elevated at 24 K, blood cultures with no growth in 3 days, urine culture with mixed racheal, repeat urine culture with no growth. Chest x-ray shows bilateral atelectasis and left trace pleural effusion. CT head shows Old right frontal meningioma, Severe central atrophy and chronic white matter disease, No acute process identified on noncontrast CT without comparison. Assessment required an independent historian, additional information obtained from patient's bedside RN who reports receiving IV fluids, nonverbal when awake. Discussed management of patient's hypernatremia and goals of care with hospitalist Dr. Kiana Stokes.   Spoke to patient's daughter/medical power of

## 2023-11-06 NOTE — PROGRESS NOTES
Occupational Therapy Evaluation Attempt    Chart reviewed. Attempted Occupational Therapy Evaluation/Treatment, however, patient unable to be seen due to:  []  Nausea/vomiting  []  Eating  []  Pain  [x]  Patient too lethargic  []  Off Unit for testing/procedure  []  Dialysis treatment in progress   []  Telemetry Results  []  Other:     Patient did not arouse to verbal or tactile stimulation. Patient bilateral upper extremity passive ROM is WFL, no contractures noted. Noted R hand edema in fingers, coban wrap over IV site. Released coban wrap and reapplied more loosely (informed RN Rose of this). Pillow placed under R arm to help with edema. Patient did move R hand arm minimally actively (mostly elbow flexion/extension), but not following commands or opening eyes. Will place patient on three day trial to see if able to actively participate in therapy sessions. Will follow up later as patient's schedule allows.    Thank you for this referral.  Ferdinand Gallagher OTR/L

## 2023-11-06 NOTE — ACP (ADVANCE CARE PLANNING)
Advance Care Planning Conversation     Pertinent diagnoses:  multiple strokes, hypertension, vascular dementia, chronic left-sided weakness, nonverbal at baseline, hypernatremia, leukocytosis, dehydration, DEEPTHI, and altered mental status    The patient and/or family consented to a voluntary [address] [place] [address] conversation. Individuals present for the conversation: patient, patient's daughter/CHRISTINA Akhtar      Summary of the conversation: Patient has an AMD on file naming her daughter Bautista Akhtar as MPOA. Patient is a DNR/DNI. Details of the conversation and documents completed: Palliative medicine team including Tara Mckeon RN and I met with patient at patient's bedside. Patient is lying in bed with eyes closed, respirations are even and unlabored on room air. Patient appears to be resting comfortably. Did not wake up to verbal or light tactile stimuli. Patient is not able to participate in her goals of care discussions at this time. Called patient's daughter/MPOA Bautista Akhtar who reports that patient was recently admitted to [facility] for CVA, was at [address] for rehab, and prior to this patient lived at home with her daughter Jessica Cunningham. Introduced our role as palliative medicine team.  Patient has an AMD on file naming her daughter Bautista Akhtar as medical power of . Discussed the benefits and burdens of CPR in the event of cardiopulmonary arrest.  Discussed the benefits and burdens of intubation in the event of respiratory decline Leslie Diane reports that her mother is a DNR/DNI. Daughter explains how hospice has been discussed at her previous hospitalization, but feels that she wants to see if her mother improves enough to be a rehab candidate. Daughter is willing to have further conversations depending on patient's response to medical management.      I spent 30 minutes providing separately identifiable ACP services with the patient and/or surrogate decision maker in a voluntary, in-person conversation discussing the patient's wishes and goals as detailed in the above note.       Angela Richmond, ANDREI  Palliative Medicine

## 2023-11-07 PROBLEM — R41.89 UNRESPONSIVE: Status: ACTIVE | Noted: 2023-11-07

## 2023-11-07 PROBLEM — Z51.5 END OF LIFE CARE: Status: ACTIVE | Noted: 2023-11-07

## 2023-11-07 LAB
ANION GAP SERPL CALC-SCNC: 16 MMOL/L (ref 3–18)
BUN SERPL-MCNC: 119 MG/DL (ref 7–18)
BUN/CREAT SERPL: 31 (ref 12–20)
CALCIUM SERPL-MCNC: 7.9 MG/DL (ref 8.5–10.1)
CHLORIDE SERPL-SCNC: 107 MMOL/L (ref 100–111)
CO2 SERPL-SCNC: 19 MMOL/L (ref 21–32)
CREAT SERPL-MCNC: 3.8 MG/DL (ref 0.6–1.3)
GLUCOSE BLD STRIP.AUTO-MCNC: 183 MG/DL (ref 70–110)
GLUCOSE SERPL-MCNC: 153 MG/DL (ref 74–99)
POTASSIUM SERPL-SCNC: 4.6 MMOL/L (ref 3.5–5.5)
SODIUM SERPL-SCNC: 142 MMOL/L (ref 136–145)

## 2023-11-07 PROCEDURE — 82962 GLUCOSE BLOOD TEST: CPT

## 2023-11-07 PROCEDURE — 1100000000 HC RM PRIVATE

## 2023-11-07 PROCEDURE — 80048 BASIC METABOLIC PNL TOTAL CA: CPT

## 2023-11-07 PROCEDURE — 6360000002 HC RX W HCPCS: Performed by: FAMILY MEDICINE

## 2023-11-07 PROCEDURE — 36415 COLL VENOUS BLD VENIPUNCTURE: CPT

## 2023-11-07 PROCEDURE — 6370000000 HC RX 637 (ALT 250 FOR IP): Performed by: NURSE PRACTITIONER

## 2023-11-07 PROCEDURE — 99233 SBSQ HOSP IP/OBS HIGH 50: CPT | Performed by: NURSE PRACTITIONER

## 2023-11-07 PROCEDURE — 2580000003 HC RX 258: Performed by: INTERNAL MEDICINE

## 2023-11-07 RX ORDER — MORPHINE SULFATE 20 MG/ML
2.5 SOLUTION ORAL
Status: DISCONTINUED | OUTPATIENT
Start: 2023-11-07 | End: 2023-11-10 | Stop reason: HOSPADM

## 2023-11-07 RX ORDER — LORAZEPAM 2 MG/ML
0.5 CONCENTRATE ORAL EVERY 4 HOURS PRN
Status: DISCONTINUED | OUTPATIENT
Start: 2023-11-07 | End: 2023-11-10 | Stop reason: HOSPADM

## 2023-11-07 RX ORDER — SENNA AND DOCUSATE SODIUM 50; 8.6 MG/1; MG/1
1 TABLET, FILM COATED ORAL 2 TIMES DAILY
Status: DISCONTINUED | OUTPATIENT
Start: 2023-11-07 | End: 2023-11-10 | Stop reason: HOSPADM

## 2023-11-07 RX ORDER — ONDANSETRON 4 MG/1
4 TABLET, ORALLY DISINTEGRATING ORAL EVERY 6 HOURS PRN
Status: DISCONTINUED | OUTPATIENT
Start: 2023-11-07 | End: 2023-11-10 | Stop reason: HOSPADM

## 2023-11-07 RX ORDER — SCOLOPAMINE TRANSDERMAL SYSTEM 1 MG/1
1 PATCH, EXTENDED RELEASE TRANSDERMAL
Status: DISCONTINUED | OUTPATIENT
Start: 2023-11-07 | End: 2023-11-10 | Stop reason: HOSPADM

## 2023-11-07 RX ADMIN — MORPHINE SULFATE 2.5 MG: 100 SOLUTION ORAL at 15:41

## 2023-11-07 RX ADMIN — DEXTROSE MONOHYDRATE: 50 INJECTION, SOLUTION INTRAVENOUS at 05:01

## 2023-11-07 RX ADMIN — MORPHINE SULFATE 2.5 MG: 100 SOLUTION ORAL at 22:35

## 2023-11-07 RX ADMIN — HEPARIN SODIUM 5000 UNITS: 5000 INJECTION INTRAVENOUS; SUBCUTANEOUS at 10:06

## 2023-11-07 RX ADMIN — MORPHINE SULFATE 2.5 MG: 100 SOLUTION ORAL at 18:01

## 2023-11-07 NOTE — ACP (ADVANCE CARE PLANNING)
Palliative Medicine    CODE STATUS: DNR/DNI; comfort measures only    AMD Status: on file naming her daughter, Marvel Rainey as her MPOA     1 Seen today in room 329 along with Sheila Sher NP. Lying supine. No response to verbal or tactile stimulation. Has not awakened since we saw her yesterday. Respirations tachypneic but did not appear stressed. On room air. Creatinine slowly climbing. No PO intake. 1015: telephone call with Marvel Rainey, daughter/MPOA. Brief medical update given. Highly encouraged to consider transition to comfort measures with transition to hospice. She agreed to same. She wants her mother to come home with hospice. Comfort orders placed including hospice consultation. Clinical nurse, hospitalist, and care manager updated with plan. Disposition plan: home with hospice support. Palliative care will continue to follow Anant Richardson  and her family during her hospitalization and support them as they make healthcare decisions and define goals of care.       Khari Luz RN, MSN  Palliative Medicine  P: [phone]

## 2023-11-07 NOTE — PROGRESS NOTES
Hospice referral received. Chart review in process. Thank you for the referral to [facility]. If we can be of further assistance please contact [phone].      Meri Samayoa MDIV, BSN, RN  Hospice Liaison  [facility]  [address]  Office: [phone]  Fax: [phone]  Mobile:  [phone]  Email: [email or web address]

## 2023-11-07 NOTE — PROGRESS NOTES
Nephrology Progress Note    Patient: Sabina Guerrero    History of Present Illness:  Sabina Guerrero is a 80 y.o. female with a past medical history significant for HTN, HLD, Meningioma, CVA, Aphasia, NH resident who was brought to the ED for evaluation of  Altered mental status and abnormal labs. She had not been tolerating po intake for a couple of days at the NH  CT head unremarkable for Acute findings  Labs in ED  remarkable for S Na 168  Hgb18.4 WBC 20.9  Nephrology was consulted for further evaluation and management of Patient was started on IVF D%W and Iv antibiotics and admitted     Post void residual was high and woods catheter was placed. S Na today down to 147=>142 this a.m. Cr 3.8   Remains obtunded. Renal US- no hydro, medical renal dz    Past Medical History:  Patient Active Problem List   Diagnosis    Hypernatremia    Leukocytosis    DM (diabetes mellitus) ([address])    HTN (hypertension)    Recent cerebrovascular accident (CVA)    History of hypothyroidism    DEEPTHI (acute kidney injury) ([address])       Allergy:  Allergies   Allergen Reactions    Penicillins Other (See Comments)     Patient's daughter, Caridad Rivera, states she is unaware of the exact reaction penicillin causes, but that the reaction led to a hospital stay.     Sulfamethoxazole-Trimethoprim         Medication:      Inpatient meds:  Current Facility-Administered Medications   Medication Dose Route Frequency    heparin (porcine) injection 5,000 Units  5,000 Units SubCUTAneous Q8H    sodium chloride flush 0.9 % injection 5-40 mL  5-40 mL IntraVENous 2 times per day    sodium chloride flush 0.9 % injection 5-40 mL  5-40 mL IntraVENous PRN    0.9 % sodium chloride infusion   IntraVENous PRN    ondansetron (ZOFRAN-ODT) disintegrating tablet 4 mg  4 mg Oral Q8H PRN    Or    ondansetron (ZOFRAN) injection 4 mg  4 mg IntraVENous Q6H PRN    polyethylene glycol (GLYCOLAX) packet 17 g  17 g Oral Daily PRN    acetaminophen (TYLENOL) tablet 650 mg  650 mg Oral

## 2023-11-07 NOTE — PROGRESS NOTES
Palliative Medicine Consult    Patient Name: Tuyet Ohara  YOB: 1933    Date of Initial Consult: 2023  Date of follow up: 2023  Reason for Consult: Goals of care discussions  Requesting Provider: Jessica Reynaga MD   Primary Care Physician: Mali Jean MD      SUMMARY:   Tuyet Ohara is a 80 y.o. with a past history of multiple strokes, hypertension, vascular dementia, chronic left-sided weakness, nonverbal at baseline, who was admitted on 11/3/2023 from [address] [place] [address] with a diagnosis of hypernatremia, leukocytosis, dehydration, DEEPTHI, and altered mental status. Current medical issues leading to Palliative Medicine involvement include: Goals of care discussions. 2023: Patient is lying in bed with eyes closed, no response to verbal or light tactile stimulation. Patient has not awakened since we saw her yesterday. Respirations slightly tachypneic. Cannot accept po intake due to mental status. Patient is now comfort measures only. PALLIATIVE DIAGNOSES:   Goals of care/advance care planning  Hypernatremia/dehydration  DEEPTHI  Altered mental status  Debility       PLAN:   Goals of care/advance care plannin/7/2023: Palliative medicine team including William Diez, BEN and I met with patient at patient's bedside. Patient is lying in bed with eyes closed, no response to verbal or light tactile stimulation. Patient has not awakened since we saw her yesterday. Respirations slightly tachypneic. Cannot accept po intake due to mental status. Patient at high risk for dehydration with electrolyte derangements again because she is not able to eat and drink due to her mental status. Nephrology is following and providing IVF, but patient has not been alert enough to receive oral intake. Spoke to patient's MPOA/daughter Rere Wing today about current health situation and highly encouraged consideration of comfort measures with hospice at discharge. 11/06/2023 05:37 PM    HGB 14.7 11/06/2023 05:37 PM     11/06/2023 05:37 PM     Lab Results   Component Value Date/Time     11/07/2023 04:06 AM    K 4.6 11/07/2023 04:06 AM     11/07/2023 04:06 AM    CO2 19 11/07/2023 04:06 AM     11/07/2023 04:06 AM    MG 2.5 11/05/2023 12:17 PM      Lab Results   Component Value Date/Time    TP 7.2 11/03/2023 01:40 PM    GLOB 4.4 11/03/2023 08:27 PM     No results found for: \"INR\", \"PTMR\", \"PT1\", \"APTT\"   No results found for: \"IRON\", \"TIBC\", \"IBCT\", \"FERR\"   No results found for: \"PH\", \"PCO2\", \"PO2\"  No components found for: \"GLPOC\"   No results found for: \"CPK\", \"CKMB\", \"TROPONINI\"               Today, I am providing high complexity decision making for patient with the diagnosis of hypernatremia, dehydration, DEEPTHI, leukocytosis, and recent CVA with aphasia and bedbound status. Patient is drowsy, and does not wake up enough for po intake. Daughter/MPOA Holland Gallegos has made the decision today for comfort measures only, with plan to discharge home with hospice. Disclaimer: Sections of this note are dictated using utilizing voice recognition software, which may have resulted in some phonetic based errors in grammar and contents. Even though attempts were made to correct all the mistakes, some may have been missed, and remained in the body of the document. If questions arise, please contact our department.

## 2023-11-07 NOTE — CARE COORDINATION
Late Note:   Spoke to Cory Lovelace , patient's daughter phone # [phone]. .   Ms. Sanjana Cronin states she wants to bring her mother (patient) home at [address], [place], [place]. Brittamichelle Maurisio voiced that she spoke with Jane (Cheryl) at [facility] and doesn't know if she can service the area at [place]. It was confirmed with Cheryl that [facility] is not able to provide hospice coverage for the [place] area. MS Sanjana Cronin stated she cannot bring her mother home until she has services and a hospital bed. Education provided on Choice of the Hospice provider. . Ms. Sanjana Cronin voiced understanding and [facility] [address] [facility]. Spoke to Adeel Pascual [place] for [employer] [phone]. . informed her that the daughter wants to be called at 10am for the intake process and delivery of the equipment. She wants the patient to be able to come home around 3 pm tomorrow if the equipment and services are in place.

## 2023-11-07 NOTE — PROGRESS NOTES
[address] patient's daughter Raquel Thrasher to discuss hospice referral. Spoke to daughter about hospice and the services we provide. Daughter is agreeable to hospice care at discharge. Daughter told Geronimo Simmons that she does not want her mother to return to [facility] due to concerns she had about the care she received there. Per daughter, plan is for patient to live with her at the following address: [address] [facility], [address] [facility]. Advised address is outside of the [address] [place] [address] service area. Daughter agreeable to using hospice agency that services her area. [facility] [facility] updated. [address] [facility] Kaleigh Lebron with update, requested CM referral to hospice agency that services the [facility] area. CM responded that [facility] is working on that. [address] [place] [address] will continue following at a distance until discharge.     Goldie Fabry, MDIV, BSN, RN  Hospice Liaison  [address] [place] [address], [address] [place] [address]  Office: [phone]  Fax: [phone]  Mobile:  [phone]  Email: [email or web address]

## 2023-11-07 NOTE — PLAN OF CARE
Problem: Safety - Adult  Goal: Free from fall injury  Outcome: Progressing     Problem: Pain  Goal: Verbalizes/displays adequate comfort level or baseline comfort level  Outcome: Progressing  Flowsheets  Taken 11/6/2023 1647 by Leland Ansari RN  Verbalizes/displays adequate comfort level or baseline comfort level:   Encourage patient to monitor pain and request assistance   Assess pain using appropriate pain scale  Taken 11/6/2023 1133 by Leland Ansari RN  Verbalizes/displays adequate comfort level or baseline comfort level:   Encourage patient to monitor pain and request assistance   Assess pain using appropriate pain scale  Taken 11/6/2023 0836 by Leland Ansari RN  Verbalizes/displays adequate comfort level or baseline comfort level:   Encourage patient to monitor pain and request assistance   Assess pain using appropriate pain scale     Problem: Skin/Tissue Integrity  Goal: Absence of new skin breakdown  Description: 1. Monitor for areas of redness and/or skin breakdown  2. Assess vascular access sites hourly  3. Every 4-6 hours minimum:  Change oxygen saturation probe site  4. Every 4-6 hours:  If on nasal continuous positive airway pressure, respiratory therapy assess nares and determine need for appliance change or resting period.   Outcome: Progressing     Problem: Chronic Conditions and Co-morbidities  Goal: Patient's chronic conditions and co-morbidity symptoms are monitored and maintained or improved  Outcome: Progressing  Flowsheets (Taken 11/6/2023 0836 by Leland Ansari RN)  Care Plan - Patient's Chronic Conditions and Co-Morbidity Symptoms are Monitored and Maintained or Improved: Monitor and assess patient's chronic conditions and comorbid symptoms for stability, deterioration, or improvement

## 2023-11-07 NOTE — PROGRESS NOTES
Patient is not available to be assessed at this time. Spiritual Materials left at the bedside. Rev. Gilson Babcock MDiv.   [facility]   [phone]

## 2023-11-08 PROCEDURE — 1100000000 HC RM PRIVATE

## 2023-11-08 PROCEDURE — 99232 SBSQ HOSP IP/OBS MODERATE 35: CPT | Performed by: INTERNAL MEDICINE

## 2023-11-08 PROCEDURE — 6370000000 HC RX 637 (ALT 250 FOR IP): Performed by: NURSE PRACTITIONER

## 2023-11-08 PROCEDURE — 51702 INSERT TEMP BLADDER CATH: CPT

## 2023-11-08 PROCEDURE — 6370000000 HC RX 637 (ALT 250 FOR IP): Performed by: INTERNAL MEDICINE

## 2023-11-08 RX ORDER — LORAZEPAM 2 MG/ML
0.5 CONCENTRATE ORAL EVERY 4 HOURS PRN
Qty: 21 ML | Refills: 0 | Status: SHIPPED | OUTPATIENT
Start: 2023-11-08 | End: 2023-11-09 | Stop reason: SDUPTHER

## 2023-11-08 RX ORDER — SCOLOPAMINE TRANSDERMAL SYSTEM 1 MG/1
1 PATCH, EXTENDED RELEASE TRANSDERMAL
Qty: 4 PATCH | Refills: 0 | Status: SHIPPED | OUTPATIENT
Start: 2023-11-10 | End: 2023-11-09 | Stop reason: SDUPTHER

## 2023-11-08 RX ORDER — MORPHINE SULFATE 20 MG/ML
2.5 SOLUTION ORAL
Qty: 4.5 ML | Refills: 0 | Status: SHIPPED | OUTPATIENT
Start: 2023-11-08 | End: 2023-11-09 | Stop reason: SDUPTHER

## 2023-11-08 RX ORDER — ONDANSETRON 4 MG/1
4 TABLET, ORALLY DISINTEGRATING ORAL EVERY 6 HOURS PRN
Qty: 10 TABLET | Refills: 0 | Status: SHIPPED | OUTPATIENT
Start: 2023-11-08 | End: 2023-11-09 | Stop reason: SDUPTHER

## 2023-11-08 RX ORDER — MORPHINE SULFATE 20 MG/ML
2.5 SOLUTION ORAL EVERY 6 HOURS
Status: DISCONTINUED | OUTPATIENT
Start: 2023-11-08 | End: 2023-11-10 | Stop reason: HOSPADM

## 2023-11-08 RX ADMIN — MORPHINE SULFATE 2.5 MG: 100 SOLUTION ORAL at 21:34

## 2023-11-08 RX ADMIN — MORPHINE SULFATE 2.5 MG: 100 SOLUTION ORAL at 06:09

## 2023-11-08 RX ADMIN — MORPHINE SULFATE 2.5 MG: 100 SOLUTION ORAL at 15:41

## 2023-11-08 RX ADMIN — MORPHINE SULFATE 2.5 MG: 100 SOLUTION ORAL at 02:47

## 2023-11-08 NOTE — CARE COORDINATION
Late Entry :    Several discussion with daughter, Jesus Montesinos [phone] regarding actual discharge of mom to home with hospice. Final decision as follows per daughter:   1 delivery of DME and hospital bed this evening  2. Patient can go to daughter's home in am via medical transport.      Notified [address] [facility] [phone] agreeable with dc plan     Transport to  the patient tentatively at 1130 am on 11/9/2023

## 2023-11-08 NOTE — PROGRESS NOTES
Palliative Medicine    CODE STATUS: DNR/DNI; comfort measures only    AMD Status: on file naming her daughter, Henri Forde, as her MPOA     1100 Seen today in room 329 along with Dr. Teresa Nelson. Lying supine with no response to verbal or tactile stimulation. Respirations mildly tachypneic--Dr Stevie Sanabria to review medical orders to make sure she is receiving adequate analgesia. Disposition plan: anticipate home with hospice tomorrow. Palliative care will continue to follow Kasandra Romero  and her family during her hospitalization and support them as they make healthcare decisions and define goals of care.       Ino Brown, RN, MSN  Palliative Medicine  P: [phone]

## 2023-11-08 NOTE — PLAN OF CARE
Problem: ABCDS Injury Assessment  Goal: Absence of physical injury  Outcome: Progressing  Flowsheets (Taken 11/8/2023 1857)  Absence of Physical Injury: Implement safety measures based on patient assessment

## 2023-11-08 NOTE — PROGRESS NOTES
Palliative Medicine follow-up progress note    Patient Name: Annemarie Sampson  YOB: 1933    Date of Initial Consult: 2023  Date of follow up: 2023  Reason for Consult: Goals of care discussions  Requesting Provider: Kath Bain MD   Primary Care Physician: Sandhya Clemente MD      SUMMARY:   Annemarie Sampson is a 80 y.o. with a past history of multiple strokes, hypertension, vascular dementia, chronic left-sided weakness, nonverbal at baseline, who was admitted on 11/3/2023 from [address] [place] [address] with a diagnosis of hypernatremia, leukocytosis, dehydration, DEEPTHI, and altered mental status. Current medical issues leading to Palliative Medicine involvement include: Goals of care discussions. 23: Patient was seen in presence of palliative care RN. Patient is unresponsive currently. Moves her right hand. Tachypnea present. No new issues per nursing staff. 2023: Patient is lying in bed with eyes closed, no response to verbal or light tactile stimulation. Patient has not awakened since we saw her yesterday. Respirations slightly tachypneic. Cannot accept po intake due to mental status. Patient is now comfort measures only. PALLIATIVE DIAGNOSES:   Goals of care/advance care planning  Hypernatremia/dehydration  DEEPTHI  Altered mental status  Debility  Comfort measures only here and hospice on discharge       PLAN:   Goals of care/advance care plannin/8/23: Patient was seen in presence of palliative care RN. Patient is unresponsive. Has had mild tachypnea and mouth breathing. She moves her right upper extremity but does not follow any meaningful verbal or tactile commands. Patient is currently on scopolamine patch and as needed Ativan as well as morphine. Because of patient's tachypnea, we will put patient on low-dose scheduled morphine sublingual and monitor her symptoms.  is working on discharge plan.   Plan: DNR/DNI, comfort

## 2023-11-08 NOTE — DISCHARGE SUMMARY
Diminished flow on the left possibly artifactual. Basilar artery: The basilar artery is within limits. Posterior cerebral artery: Left PCA is supplied by the posterior communicating artery (fetal PCA). Right PCA is patent with suboptimal evaluation for stenoses. Motion degraded examination which decreases sensitivity and limits evaluation of stenoses. Diminished flow within the left A1 and A2 CARL, diminished visualization of the distal right A2, possibly artifactual, flow-limiting stenosis or occlusion. Signed By: Pili Collins MD on 10/13/2023 2:24 PM    Echo Cardiogram Complete    Result Date: 10/13/2023  CONCLUSIONS   * No evidence of shunt at atrial level by two-dimensional, color flow and negative bubble study. * Left ventricular systolic function is hyperdynamic with an ejection fraction of 75 % by visual estimation. * Left ventricular chamber size is decreased. * Mild concentric left ventricular hypertrophy. * Left ventricular diastolic function: normal.   * Right ventricular chamber dimension is normal.   * Right ventricular systolic function is normal.   * Unable to estimate pulmonary arterial pressure due to inadequate tricuspid regurgitant Doppler waveforms. * There is mild aortic valve stenosis with a peak velocity of 2.7 m/s, mean gradient of 17 mmHg, and aortic valve area of 0.97 cm2, dimensionless index 0.4. visually does not appear severe. * The mitral valve has calcified leaflets with reduced excursion of the leaflets. MG 4 mmHg at a HR of 102 bpm.   * For the indication of stroke/TIA/peripheral embolic event. , findings are as above. Comparison   * No prior study is available for comparison.  Patient Info Name:     Maria Lobato Age:     80 years :     10/5/1933 Gender:     Female MRN:     [id number] Ht:     61 in Wt:     141 lb BSA:     1.68 m2 BP:     166 /     81 mmHg Exam Date:     10/13/2023 7:23 AM Patient Status:     IP Exam Type:     ECHO CARDIOGRAM COMPLETE mastoids well aerated. Calvarium appears intact. Superficial soft tissues unremarkable. No clearly acute findings. Of note, MRI is more sensitive for detecting acute infarct. Wet read placed on viz. I messaging software at 2012 hours, 10/10/2023. Evidence of bilateral basal ganglia region small remote infarcts. -Moderate burden of periventricular and beyond presumed chronic microvascular ischemic change. Signed By: Chucky Gordon MD on 10/10/2023 8:13 PM      No results found for this or any previous visit.       CC: Coral Burrell MD

## 2023-11-09 LAB
BACTERIA SPEC CULT: NORMAL
BACTERIA SPEC CULT: NORMAL
SERVICE CMNT-IMP: NORMAL
SERVICE CMNT-IMP: NORMAL

## 2023-11-09 PROCEDURE — 99232 SBSQ HOSP IP/OBS MODERATE 35: CPT | Performed by: NURSE PRACTITIONER

## 2023-11-09 PROCEDURE — 6370000000 HC RX 637 (ALT 250 FOR IP): Performed by: INTERNAL MEDICINE

## 2023-11-09 PROCEDURE — 1100000000 HC RM PRIVATE

## 2023-11-09 RX ORDER — SCOLOPAMINE TRANSDERMAL SYSTEM 1 MG/1
1 PATCH, EXTENDED RELEASE TRANSDERMAL
Qty: 4 PATCH | Refills: 0 | Status: SHIPPED | OUTPATIENT
Start: 2023-11-10 | End: 2023-11-11

## 2023-11-09 RX ORDER — ONDANSETRON 4 MG/1
4 TABLET, ORALLY DISINTEGRATING ORAL EVERY 6 HOURS PRN
Qty: 10 TABLET | Refills: 0 | Status: SHIPPED | OUTPATIENT
Start: 2023-11-09 | End: 2023-11-11

## 2023-11-09 RX ORDER — MORPHINE SULFATE 20 MG/ML
2.5 SOLUTION ORAL
Qty: 4.5 ML | Refills: 0 | Status: SHIPPED | OUTPATIENT
Start: 2023-11-09 | End: 2023-11-11

## 2023-11-09 RX ORDER — LORAZEPAM 2 MG/ML
0.5 CONCENTRATE ORAL EVERY 4 HOURS PRN
Qty: 21 ML | Refills: 0 | Status: SHIPPED | OUTPATIENT
Start: 2023-11-09 | End: 2023-11-11

## 2023-11-09 RX ADMIN — MORPHINE SULFATE 2.5 MG: 100 SOLUTION ORAL at 03:25

## 2023-11-09 RX ADMIN — MORPHINE SULFATE 2.5 MG: 100 SOLUTION ORAL at 08:45

## 2023-11-09 RX ADMIN — MORPHINE SULFATE 2.5 MG: 100 SOLUTION ORAL at 23:42

## 2023-11-09 RX ADMIN — MORPHINE SULFATE 2.5 MG: 100 SOLUTION ORAL at 14:20

## 2023-11-09 NOTE — PROGRESS NOTES
Palliative Medicine follow-up progress note    Patient Name: Gladys Gan  YOB: 1933    Date of Initial Consult: 2023  Date of follow up: 2023  Reason for Consult: Goals of care discussions  Requesting Provider: Ritu Corona MD   Primary Care Physician: Cristo Leung MD      SUMMARY:   Gladys Gan is a 80 y.o. with a past history of multiple strokes, hypertension, vascular dementia, chronic left-sided weakness, nonverbal at baseline, who was admitted on 11/3/2023 from [address] [place] [address] with a diagnosis of hypernatremia, leukocytosis, dehydration, DEEPTHI, and altered mental status. Current medical issues leading to Palliative Medicine involvement include: Goals of care discussions. 2023: Patient is lying in bed, eyes partially open, moving right hand but does not follow commands or have any meaningful interactions. Patient appears comfortable, respirations unlabored on room air. 23: Patient was seen in presence of palliative care RN. Patient is unresponsive currently. Moves her right hand. Tachypnea present. No new issues per nursing staff. 2023: Patient is lying in bed with eyes closed, no response to verbal or light tactile stimulation. Patient has not awakened since we saw her yesterday. Respirations slightly tachypneic. Cannot accept po intake due to mental status. Patient is now comfort measures only. PALLIATIVE DIAGNOSES:   Goals of care/advance care planning  Hypernatremia/dehydration  DEEPTHI  Altered mental status  Debility  Comfort measures only here and hospice on discharge       PLAN:   Goals of care/advance care plannin/9/2023: Palliative medicine team including Tara Mckeon RN and I met with patient at patient's bedside. Patient is lying in bed, eyes partially open, moving right hand but does not follow commands or have any meaningful interactions.  Patient appears comfortable, respirations unlabored on room Other lack of coordination     Weakness       History reviewed. No pertinent surgical history. History reviewed. No pertinent family history. History reviewed, no pertinent family history. Social History     Tobacco Use    Smoking status: Not on file    Smokeless tobacco: Not on file   Substance Use Topics    Alcohol use: Not on file     Allergies   Allergen Reactions    Penicillins Other (See Comments)     Patient's daughter, Juany Tamayo, states she is unaware of the exact reaction penicillin causes, but that the reaction led to a hospital stay.     Sulfamethoxazole-Trimethoprim       Current Facility-Administered Medications   Medication Dose Route Frequency    morphine 20MG/ML concentrated solution 2.5 mg  2.5 mg SubLINGual Q6H    sennosides-docusate sodium (SENOKOT-S) 8.6-50 MG tablet 1 tablet  1 tablet Oral BID    morphine 20MG/ML concentrated solution 2.5 mg  2.5 mg SubLINGual Q2H PRN    scopolamine (TRANSDERM-SCOP) transdermal patch 1 patch  1 patch TransDERmal Q72H    ondansetron (ZOFRAN-ODT) disintegrating tablet 4 mg  4 mg SubLINGual Q6H PRN    LORazepam (ATIVAN) 2 MG/ML concentrated solution 0.5 mg  0.5 mg SubLINGual Q4H PRN    sodium chloride flush 0.9 % injection 5-40 mL  5-40 mL IntraVENous PRN    acetaminophen (TYLENOL) tablet 650 mg  650 mg Oral Q6H PRN    Or    acetaminophen (TYLENOL) suppository 650 mg  650 mg Rectal Q6H PRN          LAB AND IMAGING FINDINGS:     Lab Results   Component Value Date/Time    WBC 19.9 11/06/2023 05:37 PM    HGB 14.7 11/06/2023 05:37 PM     11/06/2023 05:37 PM     Lab Results   Component Value Date/Time     11/07/2023 04:06 AM    K 4.6 11/07/2023 04:06 AM     11/07/2023 04:06 AM    CO2 19 11/07/2023 04:06 AM     11/07/2023 04:06 AM    MG 2.5 11/05/2023 12:17 PM      Lab Results   Component Value Date/Time    TP 7.2 11/03/2023 01:40 PM    GLOB 4.4 11/03/2023 08:27 PM     No results found for: \"INR\", \"PTMR\", \"PT1\", \"APTT\"   No results found

## 2023-11-09 NOTE — PROGRESS NOTES
PCT informed the writer patient's O2 is in the 80%. This writer assessed patient. Patient's eyes closed taking deep breaths at this time. No sign of air hunger. nPatient placed on 1L NC. O2 is now 99% .   Patient left In no sign of distress

## 2023-11-09 NOTE — PROGRESS NOTES
Palliative Medicine    CODE STATUS: DNR/DNI; comfort measures only    AMD Status: on file naming her daughter, Temi Rojas, as her MPOA     Seen today in room 329 along with Dagoberto Mosley NP. Lying supine with head of bed elevated. No verbalization. Was moving right arm picking at gown. Unable to follow directions of squeezing my fingers. Respirations unlabored on room air. Has been accepted by a hospice in her daughter's catchment area. Awaiting transport (DME scheduled for delivery to her daughter's home yesterday)    Disposition plan: anticipate discharge with hospice support. Palliative care will continue to follow Radha Antunez  and her family during her hospitalization and support them as they make healthcare decisions and define goals of care.       Sampson Lawson RN, MSN  Palliative Medicine  P: [phone]

## 2023-11-09 NOTE — CARE COORDINATION
At 4918 Myrna Kaur transport here to bring patient home. Report called to daughter, Nataliia Ivory, [phone]. Ms Harry Mark informs nurse that the bed was never delivered. Transport placed on Hold    1135 Placed calls to [employer] and Daughter    Daughter Romain Alejandro) states no one delivered the bed. It must have been delivered at someone else's home. [address] to [facility]. . Found out late last night --daughter refused to allow DME company in the home. Samanta Rice is going to place a 3 way call to Daughter, [address] to coordinate start of hospice care, delivery of DME and transport of the patient. [address] to cancel transport for now . ..

## 2023-11-09 NOTE — DISCHARGE SUMMARY
[place] SUMMARY    Name:  Tammi Jaramillo  MR#:   [id number]  :  10/05/1933  ACCOUNT #:  [de-identified]  ADMIT DATE:  2023  DISCHARGE DATE:  2023      ADDENDUM:    The patient's status is to discharge home with hospice. The admission HPI and hospital course are noted. She had significant hypernatremia when she came in, poor mentation, leukocytosis, and acute kidney injury. She has had a recent stroke. The patient remains poorly responsive with moaning with tactile stimulation. She is not able to take in oral because of her diminished mentation and severely debilitated advanced illness. At this point, the family is taking her home with home hospice on comfort care. Yesterday's anticipated discharge was cancelled because of equipment needed for home, and at this point, we were anticipating all of that should be arranged for them to connect with the [facility] today and that they have a hospital bed. On exam today, this elderly lady is minimally responsive. Her eyes are open and mouth is open. She moans when she is touched or examined. She is not able to communicate normally or follows commands. Blood pressure is 122/56, pulse 79, respiratory rate 20, temperature 97.9, and O2 sat is 99%. Mouth is dry. Mostly edentulous. Lungs are clear anteriorly. Cardiac exam, regular rate and rhythm. Abdomen is obese and soft. Lower extremities, mild edema. The patient will discharge home with [facility] and medications. It looks like have been printed in anticipation of that as requested. There is no other clinical barriers to her discharge to home hospice today. She has a do not resuscitate code status and her case has been followed along by the Palliative team here. She is on comfort care. Total time on discharge 45 minutes.       Griselda Kwon MD      [id number]  D:  2023 9:31  T:  2023 9:49  JOB #:  [id number]

## 2023-11-09 NOTE — CARE COORDINATION
Per Samanta Rice with [facility] -- DME has not been delivered yet. . Will update  in am

## 2023-11-10 VITALS
HEART RATE: 129 BPM | OXYGEN SATURATION: 94 % | DIASTOLIC BLOOD PRESSURE: 93 MMHG | RESPIRATION RATE: 22 BRPM | SYSTOLIC BLOOD PRESSURE: 154 MMHG | WEIGHT: 128.97 LBS | TEMPERATURE: 97.7 F

## 2023-11-10 PROCEDURE — 6370000000 HC RX 637 (ALT 250 FOR IP): Performed by: NURSE PRACTITIONER

## 2023-11-10 PROCEDURE — 6370000000 HC RX 637 (ALT 250 FOR IP): Performed by: INTERNAL MEDICINE

## 2023-11-10 RX ORDER — ATROPINE SULFATE 10 MG/ML
1 SOLUTION/ DROPS OPHTHALMIC AS NEEDED
Status: DISCONTINUED | OUTPATIENT
Start: 2023-11-10 | End: 2023-11-10 | Stop reason: HOSPADM

## 2023-11-10 RX ADMIN — MORPHINE SULFATE 2.5 MG: 100 SOLUTION ORAL at 05:17

## 2023-11-10 RX ADMIN — LORAZEPAM 0.5 MG: 2 SOLUTION, CONCENTRATE ORAL at 08:02

## 2023-11-10 RX ADMIN — MORPHINE SULFATE 2.5 MG: 100 SOLUTION ORAL at 07:37

## 2023-11-10 NOTE — PROGRESS NOTES
Bereavement Note:     responded to the death of Gladys Gan, who is a 80 y.o., female, offering Spiritual Care to patient and family. Date of Death: 11/10/23    Extended Emergency Contact Information  Primary Emergency Contact: [address] [place] [address] Road Phone: [phone]  Mobile Phone: [phone]  Relation: Child  Preferred language: English   needed? No  Secondary Emergency Contact[facility]  Mobile Phone: [phone]  Relation: Niece/Nephew  Preferred language: English   needed? No      Home: [facility]    Chaplains will continue to follow family and will provide spiritual care as needed.      Sister Karena Ferrell, [address] [place], [facility]  [phone]

## 2023-11-10 NOTE — PROGRESS NOTES
2000 Patient breathing even and unlabored. No family at bed side  2342 See MAR, Patient woods intact. No drainage noted. No bowel movement noted at this time. Patient lifts arms frequently upon nurse assessment. Oral care complete  0056 Patient RR 11/min, spontaneous eye opening  0340 Patient RR even unlabored  0510 Patient destiny care completed small bowel movement noted.  Patient RR increased, See MAR

## 2023-11-10 NOTE — PROGRESS NOTES
1900This writer received Handoff in regards to:  Francine Phillips (51 y.o., female)    : 10/5/1933    Admitted: 11/3/2023     Report on Francine Phillips ,(80 y.o., female), was received from [email or web address], [facility]    All questions and concerns of this writer, Patient, and/or Family were addressed at this time. Report on patient's status and plan of care was given.     ________________________  11/10  This writer gave Handoff in regards to:  Francine Phillips (80 y.o., female)    : 10/5/1933    Admitted: 11/3/2023     This Patient will be received by the oncoming Nurse; Sandra PALMA RN    All questions and concerns of the Nurse, Patient, and/or Family were addressed at this time. Report on patient's status and plan of care was given. Patient given pain med.

## 2023-11-10 NOTE — DISCHARGE SUMMARY
[address] [place] [address]    Name:  Katie Mandujano  MR#:   [id number]  :  10/05/1933  ACCOUNT #:  [de-identified]  ADMIT DATE:  2023  DISCHARGE DATE:  11/10/2023    DEATH SUMMARY    This patient was not discharged to home hospice yesterday as expected on 2023. There was some issue with receiving of durable medical equipment and case management was dealing with that. In the interim, there were no clinical changes outside of what were the goals of care for comfort. The patient progressed towards the end of life and has passed away this morning at 09:05 a.m. The patient was on comfort care. She had a Do Not Resuscitate in place. On exam today, she has no heart tones, no spontaneous respirations, and no palpable pulses. The family has been notified. The patient's diagnoses at death include the following. DISCHARGE DIAGNOSES:  1. Cerebrovascular disease. 2.  Dehydration and hypernatremia. 3.  Acute kidney injury. 4.  Diabetes. 5.  Hypertension. 6.  Hypothyroidism. See the chart for further details. The patient's date of death is today 11/10/2023 and course of hospitalization is as noted in the discharge summary from yesterday and addendum to the one done previous to that as well. Total time on death summary 20 minutes.       Vilma Ramirez MD      [id number]  D:  11/10/2023 9:14  T:  11/10/2023 9:52  JOB #:  [id number]

## 2023-11-10 NOTE — PROGRESS NOTES
Contacted patient's daughter Bethany Jimenez to notify her that patient is close to passing.  Lung sounds are course, will get an order for Atropine sublingual.

## 2023-11-11 NOTE — PROGRESS NOTES
Patient has no heart tones audible, no palpable pulses, and no spontaneous respirations. Observed patient for one full minute. Dr Clif Paz on the floor and she will be notified.

## 2023-11-11 NOTE — PROGRESS NOTES
Patient noted to have no spontaneous respirations, no palpable pulse, and no heart tones for a full minute. Dr. Sandra Ohs on the floor, and she will be notified. Attempted to call patient's daughter to notify her, however I was unable to reach her.

## 2023-11-16 NOTE — PROGRESS NOTES
Physician Progress Note      PATIENT:               Chanel Avila  CSN #:                  [id number]  :                       10/5/1933  ADMIT DATE:       11/3/2023 7:53 PM  [facility] DATE:        11/10/2023 12:28 PM  RESPONDING  PROVIDER #:        Ho Moreno MD          QUERY TEXT:    Pt admitted with hypernatremia and dehydration. Pt noted to have \"DEEPTHI likely   poss ATN\" documented by Nephrologiist on - as well as in the   hospitalist note on . The final discharge summary on 11/10 only indicates   \"Acute kidney injury. \"  If possible, please document in the progress notes and   discharge summary if you are evaluating and/or treating any of the following: The medical record reflects the following:  Risk Factors: 81 yo female with poor po intake, dehydration previous CVA  Clinical Indicators: Admission labs on 11/3 Bun/creat =  64/1.33   Nephrology note: DEEPTHI likely prerenal, poss ATN. S Cr 1.33=>1.87. Poss   underlying CKD   Nephrology note: DEEPTHI likely poss ATN. S Cr 1.33=>1.87=>2.71=3.71, S Cr   worsening despite IV fluids. Poss underlying CKD. U Na 18   Discharge summary:  DEEPTHI likely poss ATN -S Cr 1.33=>1.87=>2.71=3.71, S Cr   worsening despite IV fluids. Poss underlying CKD. 11/10 Final Discharge summary:  Acute kidney injury.   Treatment: Nephrology consult, IV fluids, frequent monitoring of labs, renal   US    Defined by Kidney Disease Improving Global Outcomes (KDIGO) clinical practice   guideline for acute kidney injury:  -Increase in SCr by greater than or equal to 0.3 mg/dl within 48 hours; or  -Increase or decrease in SCr to greater than or equal to 1.5 times baseline,   which is known or presumed to have occurred within the prior 7 days; or  -Urine volume < 0.5ml/kg/h for 6 hours    Thank you  BRODY GuerreroN, RN, CCDS  CDI  Options provided:  -- Acute kidney failure with possible ATN  -- Acute kidney injury  -- Other - I will add my own diagnosis  -- Disagree - Not

## 2024-02-02 NOTE — PROGRESS NOTES
Hospitalist Progress Note    Patient: Tuyet Ohara MRN: [id number]  CSN: [id number]    YOB: 1933  Age: 80 y.o. Sex: female    DOA: 11/3/2023 LOS:  LOS: 0 days                Assessment/Plan     Active Hospital Problems    Diagnosis     Hypernatremia [E87.0]     Leukocytosis [D72.829]     DM (diabetes mellitus) ([address]) [E11.9]     HTN (hypertension) [I10]     Recent cerebrovascular accident (CVA) [Z86.73]     History of hypothyroidism [Z86.39]         Chief complaint :  hypernatremia    Hypernatremia -  Presenting serum was 168, chloride was also elevated at 130  Nephrology was consulted by ED  Patient placed on D5 water at 75 cc/h  Patient likely hypovolemic and hyponatremic likely acute in nature  Serial BMPs, monitor closely maladjustment rate as needed     Leukocytosis-  WBC 20.9 with left shift, some of which is likely hemoconcentration   Without clear source of infection  Patient also tachycardic  Patient afebrile  Because patient tachycardic with elevated white count, ED physician started cefepime IV, will monitor OFF antibiotics for now and follow temperature curve and culture results and rehydrate   Lactic acid unremarkable  Chest x-ray unremarkable  UA abnormal but had normal epithelial cells  We will order urine culture and urine collected for UA  Follow blood cultures     Diabetes mellitus-  ADA, SSI, fingerstick blood glucose before every meal and nightly     Hypertension-  Monitor blood pressure  Resume home medications as appropriate. Recent CVA-  Ordered PT and OT  Supportive care     History of hypothyroidism-  Check TSH     DVT and GI Prophylaxis     Called and updated patient's daughter on phone. Discussed poor prognosis. Disposition : TBD    Physical Exam:  General: unresponsive   HEENT: NC, Atraumatic. PERRLA, anicteric sclerae. Lungs: CTA Bilaterally. No Wheezing/Rhonchi/Rales. Heart:  S1 S2,  No murmur, No Rubs, No Gallops  Abdomen: Soft, Non distended, Non tender. Patient